# Patient Record
Sex: MALE | HISPANIC OR LATINO | Employment: UNEMPLOYED | URBAN - METROPOLITAN AREA
[De-identification: names, ages, dates, MRNs, and addresses within clinical notes are randomized per-mention and may not be internally consistent; named-entity substitution may affect disease eponyms.]

---

## 2017-03-02 ENCOUNTER — OFFICE VISIT (OUTPATIENT)
Dept: ORTHOPEDICS | Facility: CLINIC | Age: 32
End: 2017-03-02
Payer: COMMERCIAL

## 2017-03-02 ENCOUNTER — RADIANT APPOINTMENT (OUTPATIENT)
Dept: GENERAL RADIOLOGY | Facility: CLINIC | Age: 32
End: 2017-03-02
Attending: FAMILY MEDICINE
Payer: COMMERCIAL

## 2017-03-02 VITALS
DIASTOLIC BLOOD PRESSURE: 90 MMHG | WEIGHT: 190 LBS | HEART RATE: 98 BPM | BODY MASS INDEX: 28.79 KG/M2 | SYSTOLIC BLOOD PRESSURE: 138 MMHG | HEIGHT: 68 IN

## 2017-03-02 DIAGNOSIS — G89.29 CHRONIC PAIN OF RIGHT KNEE: ICD-10-CM

## 2017-03-02 DIAGNOSIS — M25.561 CHRONIC PAIN OF RIGHT KNEE: Primary | ICD-10-CM

## 2017-03-02 DIAGNOSIS — G89.29 CHRONIC PAIN OF RIGHT KNEE: Primary | ICD-10-CM

## 2017-03-02 DIAGNOSIS — M25.561 CHRONIC PAIN OF RIGHT KNEE: ICD-10-CM

## 2017-03-02 PROCEDURE — 73562 X-RAY EXAM OF KNEE 3: CPT | Mod: RT | Performed by: RADIOLOGY

## 2017-03-02 PROCEDURE — 99213 OFFICE O/P EST LOW 20 MIN: CPT | Mod: 25 | Performed by: FAMILY MEDICINE

## 2017-03-02 PROCEDURE — 20610 DRAIN/INJ JOINT/BURSA W/O US: CPT | Mod: RT | Performed by: FAMILY MEDICINE

## 2017-03-02 RX ORDER — TRIAMCINOLONE ACETONIDE 40 MG/ML
40 INJECTION, SUSPENSION INTRA-ARTICULAR; INTRAMUSCULAR ONCE
Qty: 1 ML | Refills: 0 | OUTPATIENT
Start: 2017-03-02 | End: 2017-03-02

## 2017-03-02 RX ORDER — MULTIPLE VITAMINS W/ MINERALS TAB 9MG-400MCG
1 TAB ORAL DAILY
COMMUNITY
End: 2021-12-28

## 2017-03-02 ASSESSMENT — PAIN SCALES - GENERAL: PAINLEVEL: EXTREME PAIN (9)

## 2017-03-02 NOTE — MR AVS SNAPSHOT
After Visit Summary   3/2/2017    Kenn Dobson    MRN: [id number]           Patient Information     Date Of Birth          1985        Visit Information        Provider Department      3/2/2017 2:00 PM Keivn Ospina, DO [facility]        Today's Diagnoses     Chronic pain of right knee    -  1      Care Instructions    Thanks for coming today.  Ortho/Sports Medicine Clinic  [address] [place] [address]    To schedule future appointments in Ortho Clinic, you may call [phone].    To schedule ordered imaging by your provider:   Call Central Imaging [phone]    To schedule an injection ordered by your provider:  Call Central Imaging Injection scheduling line: [phone]  Learn with Homer available online at:  [email or web address]    Please call if any further questions or concerns ([phone]).  Clinic hours 8 am to 5 pm.    Return to clinic (call) if symptoms worsen or fail to improve.          Follow-ups after your visit        Who to contact     If you have questions or need follow up information about today's clinic visit or your schedule please contact [facility] [[email or web address]] directly at [phone].  Normal or non-critical lab and imaging results will be communicated to you by MyChart, letter or phone within 4 business days after the clinic has received the results. If you do not hear from us within 7 days, please contact the clinic through Volleehart or phone. If you have a critical or abnormal lab result, we will notify you by phone as soon as possible.  Submit refill requests through Learn with Homer or call your pharmacy and they will forward the refill request to us. Please allow 3 business days for your refill to be completed.          Additional Information About Your Visit        Volleehart Information     Learn with Homer is an electronic gateway that provides easy, online access to your medical records. With Learn with Homer, you can request a  "clinic appointment, read your test results, renew a prescription or communicate with your care team.     To sign up for Carticipate visit the website at [email or web address]   You will be asked to enter the access code listed below, as well as some personal information. Please follow the directions to create your username and password.     Your access code is: [email or web address]  Expires: 2017  3:34 PM     Your access code will  in 90 days. If you need help or a new code, please contact your [facility] or call [phone] for assistance.        Care EveryWhere ID     This is your Care EveryWhere ID. This could be used by other organizations to access your [place] medical records  [id number]        Your Vitals Were     Pulse Height BMI (Body Mass Index)             98 1.715 m (5' 7.5\") 29.32 kg/m2          Blood Pressure from Last 3 Encounters:   17 138/90   14 122/77   14 120/80    Weight from Last 3 Encounters:   17 86.2 kg (190 lb)   14 85.6 kg (188 lb 12.8 oz)   14 85.6 kg (188 lb 12.8 oz)              We Performed the Following     DRAIN/INJECT LARGE JOINT/BURSA          Today's Medication Changes          These changes are accurate as of: 3/2/17  3:34 PM.  If you have any questions, ask your nurse or doctor.               Start taking these medicines.        Dose/Directions    triamcinolone acetonide 40 MG/ML injection   Commonly known as:  KENALOG   Used for:  Chronic pain of right knee   Started by:  Kevin Ospina DO        Dose:  40 mg   1 mL (40 mg) by INTRA-ARTICULAR route once for 1 dose   Quantity:  1 mL   Refills:  0            Where to get your medicines      Some of these will need a paper prescription and others can be bought over the counter.  Ask your nurse if you have questions.     You don't need a prescription for these medications     triamcinolone acetonide 40 MG/ML injection                Primary Care Provider " Office Phone # Fax #    Geraldine Priya Keyes -[phone]       [facility] [address] [facility] [address]        Thank you!     Thank you for choosing [facility]  for your care. Our goal is always to provide you with excellent care. Hearing back from our patients is one way we can continue to improve our services. Please take a few minutes to complete the written survey that you may receive in the mail after your visit with us. Thank you!             Your Updated Medication List - Protect others around you: Learn how to safely use, store and throw away your medicines at www.disposemymeds.org.          This list is accurate as of: 3/2/17  3:34 PM.  Always use your most recent med list.                   Brand Name Dispense Instructions for use    cetirizine 10 MG tablet    zyrTEC    30 tablet    Take 1 tablet (10 mg) by mouth every evening       fluticasone 50 MCG/ACT spray    FLONASE    1 Package    Spray 1-2 sprays into both nostrils daily       Multi-vitamin Tabs tablet      Take 1 tablet by mouth daily       triamcinolone acetonide 40 MG/ML injection    KENALOG    1 mL    1 mL (40 mg) by INTRA-ARTICULAR route once for 1 dose

## 2017-03-02 NOTE — PATIENT INSTRUCTIONS
Thanks for coming today.  Ortho/Sports Medicine Clinic  [address] [place] [address]    To schedule future appointments in Ortho Clinic, you may call [phone].    To schedule ordered imaging by your provider:   Call Central Imaging [phone]    To schedule an injection ordered by your provider:  Call Central Imaging Injection scheduling line: [phone]  Gogoyokohart available online at:  [email or web address]    Please call if any further questions or concerns ([phone]).  Clinic hours 8 am to 5 pm.    Return to clinic (call) if symptoms worsen or fail to improve.

## 2017-03-02 NOTE — NURSING NOTE
"Kenn Dobson's goals for this visit include: Find a solution for right collar bone and right knee pain  He requests these members of his care team be copied on today's visit information: yes    PCP: Geraldine Keyes    Referring Provider:  Referred Self, MD  No address on file    Chief Complaint   Patient presents with     Consult     Pain x 2012-Collar bone. Wrestling related injury. Right knee pain for the past 5 years.      Knee right     Shoulder right       Initial /90 (BP Location: Right arm, Patient Position: Chair, Cuff Size: Adult Regular)  Pulse 98  Ht 1.715 m (5' 7.5\")  Wt 86.2 kg (190 lb)  BMI 29.32 kg/m2 Estimated body mass index is 29.32 kg/(m^2) as calculated from the following:    Height as of this encounter: 1.715 m (5' 7.5\").    Weight as of this encounter: 86.2 kg (190 lb).  Medication Reconciliation: complete    "

## 2017-03-02 NOTE — PROGRESS NOTES
"HISTORY OF PRESENT ILLNESS  Mr. Dobson is a pleasant 31 year old year old male who presents to clinic today with two problems.  Kenn is a  at [employer].  He explains that he's had right knee pain for the past 5 years.  Uncomfortable pain, ranges from mild to severe.  He's had 3 knee scopes in this knee over his life for mensicus tears from [id number].  Pain has been fairly consistent since his 2nd surgery.  Pain is excruciating with running, definitely worse the more he's on his feet.  Feels ok when he's on a wrestling mat.  Slightly better with icing, rest is the most helpful.  Has tried braces in the past, not helpful.  Points to his medial joint line.  Will get swollen behind the knee with prolonged use. Pops and catches on occasion.  Kenn sprained his SC joint in 2012 during wrestling.  At that time was treated with conservative care.  Still has pain on most days.  Does feel clicking, will sublux on occasion, \"feels like it's out sometimes.\"  Not bothering him at the moment nearly as much as his knee.  Timing: occurs intermittently  Associated signs & symptoms: none  Previous similar pain: yes  Additional history: as documented    MEDICAL HISTORY  Patient Active Problem List   Diagnosis     Cauliflower right ear     ED (erectile dysfunction)     Internal hemorrhoid     Multiple joint pain       Current Outpatient Prescriptions   Medication Sig Dispense Refill     multivitamin, therapeutic with minerals (MULTI-VITAMIN) TABS tablet Take 1 tablet by mouth daily       fluticasone (FLONASE) 50 MCG/ACT nasal spray Spray 1-2 sprays into both nostrils daily 1 Package 11     cetirizine (ZYRTEC) 10 MG tablet Take 1 tablet (10 mg) by mouth every evening 30 tablet 3       Allergies   Allergen Reactions     Dogs        No family history on file.    Additional medical/Social/Surgical histories reviewed in [facility] and updated as appropriate.     REVIEW OF SYSTEMS (3/2/2017)  10 point ROS of systems including " "Constitutional, Eyes, Respiratory, Cardiovascular, Gastroenterology, Genitourinary, Integumentary, Musculoskeletal, Psychiatric were all negative except for pertinent positives noted in my HPI.     PHYSICAL EXAM  Vitals:    03/02/17 1404   BP: 138/90   BP Location: Right arm   Patient Position: Chair   Cuff Size: Adult Regular   Pulse: 98   Weight: 86.2 kg (190 lb)   Height: 1.715 m (5' 7.5\")     General  - normal appearance, in no obvious distress  CV  - normal popliteal pulse  Pulm  - normal respiratory pattern, non-labored  Musculoskeletal - right knee  - stance: normal gait without limp, no obvious leg length discrepancy  - inspection: trace effusion, no ecchymosis, normal muscle tone, normal bone and joint alignment, no obvious deformity  - palpation: medial joint line tenderness, patella and patellar tendon non-tender, normal popliteal pulse  - ROM: 135 degrees flexion - painful, -5 degrees extension, pain at terminal flexion and extension passively  - strength: 5/5 in flexion, 5/5 in extension  - neuro: no sensory or motor deficit  - special tests:  (-) Lachman  (+) Chavez  (-) varus at 0 and 30 degrees flexion  (-) valgus at 0 and 30 degrees flexion    Musculoskeletal - right shoulder  - palpation: no bony or soft tissue tenderness, normal clavicle, non-tender AC, non-tender SC    - strength: 5/5  strength, 5/5 in all shoulder planes  Neuro  - no sensory or motor deficit, grossly normal coordination, normal muscle tone  Skin  - no ecchymosis, erythema, warmth, or induration, no obvious rash  Psych  - interactive, appropriate, normal mood and affect        ASSESSMENT & PLAN  Mr. Dobson is a 31 year old year old male who is in the office today with right knee pain and right collarbone pain.    I ordered & reviewed an xray of his right knee which shows some mild medial joint space narrowing and lateral tibial and femoral condyle osteophytes.    We had a long discussion centering around his knee pain.  He " likely has chronic intraarticular changes secondary to traumatic events and post-surgical changes.  An MR may be helpful if he has a large cartilage lesion or meniscal pathology that could be surgical.    For now we decided to inject his knee for short term relief.    Kenn's going to let me know how he's doing after the wrestling season.    It was a pleasure taking care of Kenn.        Kevin Ospina DO, CAQSM        PROCEDURE    Knee Injection - Intraarticular  The patient was informed of the risks and the benefits of the procedure and a written consent was signed.  The patient s right knee was prepped with chlorhexidine in sterile fashion.   40 mg of triamcinolone suspension was drawn up into a 5 mL syringe with 2 mL of 1% lidocaine and 2 mL of 0.5% marcaine.  Injection was performed using substerile technique.  A 1.5-inch 25-gauge needle was used to enter the lateral aspect of the right knee.  Injection performed successfully without difficulty.  There were no complications. The patient tolerated the procedure well. There was negligible bleeding.   The patient was instructed to ice the knee upon leaving clinic and refrain from overuse over the next 3 days.   The patient was instructed to call or go to the emergency room with any unusual pain, swelling, redness, or if otherwise concerned.  A follow up appointment will be scheduled to evaluate response to the injection, and to assess range of motion and pain.  Kenalog: NDC [id number]

## 2017-06-13 ENCOUNTER — OFFICE VISIT (OUTPATIENT)
Dept: URGENT CARE | Facility: URGENT CARE | Age: 32
End: 2017-06-13
Payer: COMMERCIAL

## 2017-06-13 ENCOUNTER — RADIANT APPOINTMENT (OUTPATIENT)
Dept: GENERAL RADIOLOGY | Facility: CLINIC | Age: 32
End: 2017-06-13
Attending: FAMILY MEDICINE
Payer: COMMERCIAL

## 2017-06-13 VITALS
OXYGEN SATURATION: 98 % | SYSTOLIC BLOOD PRESSURE: 117 MMHG | WEIGHT: 184 LBS | BODY MASS INDEX: 28.39 KG/M2 | TEMPERATURE: 98 F | HEART RATE: 55 BPM | DIASTOLIC BLOOD PRESSURE: 72 MMHG

## 2017-06-13 DIAGNOSIS — M25.531 RIGHT WRIST PAIN: ICD-10-CM

## 2017-06-13 DIAGNOSIS — S66.911A STRAIN OF RIGHT WRIST, INITIAL ENCOUNTER: ICD-10-CM

## 2017-06-13 DIAGNOSIS — M25.531 RIGHT WRIST PAIN: Primary | ICD-10-CM

## 2017-06-13 PROCEDURE — 73110 X-RAY EXAM OF WRIST: CPT | Mod: RT

## 2017-06-13 PROCEDURE — 99213 OFFICE O/P EST LOW 20 MIN: CPT | Performed by: FAMILY MEDICINE

## 2017-06-13 ASSESSMENT — PAIN SCALES - GENERAL: PAINLEVEL: MODERATE PAIN (5)

## 2017-06-13 NOTE — MR AVS SNAPSHOT
"              After Visit Summary   2017    Kenn Dobson    MRN: [id number]           Patient Information     Date Of Birth          1985        Visit Information        Provider Department      2017 12:20 PM Ian Dwyer MD [facility]        Today's Diagnoses     Right wrist pain    -  1       Follow-ups after your visit        Who to contact     If you have questions or need follow up information about today's clinic visit or your schedule please contact [facility] directly at [phone].  Normal or non-critical lab and imaging results will be communicated to you by Placedhart, letter or phone within 4 business days after the clinic has received the results. If you do not hear from us within 7 days, please contact the clinic through Placedhart or phone. If you have a critical or abnormal lab result, we will notify you by phone as soon as possible.  Submit refill requests through AllDigital or call your pharmacy and they will forward the refill request to us. Please allow 3 business days for your refill to be completed.          Additional Information About Your Visit        MyChart Information     AllDigital lets you send messages to your doctor, view your test results, renew your prescriptions, schedule appointments and more. To sign up, go to [email or web address] . Click on \"Log in\" on the left side of the screen, which will take you to the Welcome page. Then click on \"Sign up Now\" on the right side of the page.     You will be asked to enter the access code listed below, as well as some personal information. Please follow the directions to create your username and password.     Your access code is: [email or web address]  Expires: 2017  1:42 PM     Your access code will  in 90 days. If you need help or a new code, please call your [facility] or [phone].        Care EveryWhere ID     This is your Care EveryWhere ID. This could be used " by other organizations to access your [place] medical records  [id number]        Your Vitals Were     Pulse Temperature Pulse Oximetry BMI (Body Mass Index)          55 98  F (36.7  C) (Oral) 98% 28.39 kg/m2         Blood Pressure from Last 3 Encounters:   06/13/17 117/72   03/02/17 138/90   06/04/14 122/77    Weight from Last 3 Encounters:   06/13/17 184 lb (83.5 kg)   03/02/17 190 lb (86.2 kg)   06/04/14 188 lb 12.8 oz (85.6 kg)               Primary Care Provider Office Phone # Fax #    Geraldine Priya Keyes [phone]       [facility] [address] [facility] [address]        Thank you!     Thank you for choosing [facility]  for your care. Our goal is always to provide you with excellent care. Hearing back from our patients is one way we can continue to improve our services. Please take a few minutes to complete the written survey that you may receive in the mail after your visit with us. Thank you!             Your Updated Medication List - Protect others around you: Learn how to safely use, store and throw away your medicines at www.disposemymeds.org.          This list is accurate as of: 6/13/17  1:42 PM.  Always use your most recent med list.                   Brand Name Dispense Instructions for use    cetirizine 10 MG tablet    zyrTEC    30 tablet    Take 1 tablet (10 mg) by mouth every evening       fluticasone 50 MCG/ACT spray    FLONASE    1 Package    Spray 1-2 sprays into both nostrils daily       Multi-vitamin Tabs tablet      Take 1 tablet by mouth daily

## 2017-06-13 NOTE — PROGRESS NOTES
Some of this note was populated by a medical assistant.    SUBJECTIVE:                                                    Kenn Dobson is a 31 year old male who presents to clinic today for the following health issues:      Musculoskeletal problem/pain   right wrist pain.     Duration: about one week- worse the last 2-3 days. Does not recall any particular injury however. May have fallen on it but does not recall.     Description  Location: left wrist, left thumb    Intensity:  5/10    Accompanying signs and symptoms: throbbing pain at times    History  Previous similar problem: has injured fingers on same hand before, but not wrist or near thumb pain.   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 May be a factor.   Aggravating factors include: weight bearing or resistant bearing activities.    Therapies tried and outcome: rest/inactivity, tylenol, iced area- no relief.         Problem list and histories reviewed & adjusted, as indicated.  Additional history: as documented    Patient Active Problem List   Diagnosis     Cauliflower right ear     ED (erectile dysfunction)     Internal hemorrhoid     Multiple joint pain     No past surgical history on file.    Social History   Substance Use Topics     Smoking status: Former Smoker     Smokeless tobacco: Not on file     Alcohol use Yes      Comment: Drinks daily     No family history on file.      Current Outpatient Prescriptions   Medication Sig Dispense Refill     multivitamin, therapeutic with minerals (MULTI-VITAMIN) TABS tablet Take 1 tablet by mouth daily       fluticasone (FLONASE) 50 MCG/ACT nasal spray Spray 1-2 sprays into both nostrils daily 1 Package 11     cetirizine (ZYRTEC) 10 MG tablet Take 1 tablet (10 mg) by mouth every evening (Patient not taking: Reported on 6/13/2017) 30 tablet 3     Allergies   Allergen Reactions     Dogs        Reviewed and updated as needed this visit  by clinical staff       Reviewed and updated as needed this visit by Provider         ROS:  Constitutional, HEENT, cardiovascular, pulmonary, gi and gu systems are negative, except as otherwise noted.    OBJECTIVE:                                                    /72 (BP Location: Left arm, Patient Position: Chair, Cuff Size: Adult Regular)  Pulse 55  Temp 98  F (36.7  C) (Oral)  Wt 184 lb (83.5 kg)  SpO2 98%  BMI 28.39 kg/m2  Body mass index is 28.39 kg/(m^2).  GENERAL: healthy, alert and no distress  NECK: no adenopathy, no asymmetry, masses, or scars and thyroid normal to palpation  RESP: lungs clear to auscultation - no rales, rhonchi or wheezes  CV: regular rate and rhythm, normal S1 S2, no S3 or S4, no murmur, click or rub, no peripheral edema and peripheral pulses strong  ABDOMEN: soft, nontender, no hepatosplenomegaly, no masses and bowel sounds normal  MS: no gross musculoskeletal defects noted, minimal edema base of thumb. Full ROM noted although slightly painful at base of thumb. Wrist area not tender. Scaphoid or snuffbox area not appreciable tender.   Mild pain at base of thumb overlying EBL EPB tendons of thumb.           ASSESSMENT/PLAN:                                                        ICD-10-CM    1. Right wrist pain M25.531 XR Wrist Right G/E 3 Views   2. Strain of right wrist, initial encounter S66.911A          PLAN  Brace declined by patient. Avoid wresting for the time until pain improves.   Activity modification described. Avoid risk of further injury. Proper pain medicines explained.   Patient educational/instructional material provided including reasons for follow-up   The patient indicates understanding of these issues and agrees with the plan.  Ian Dwyer MD        [facility]

## 2017-06-13 NOTE — NURSING NOTE
"Chief Complaint   Patient presents with     Musculoskeletal Problem     Pt c/o left wrist/hand pain near thumb for about one week.        Initial /72 (BP Location: Left arm, Patient Position: Chair, Cuff Size: Adult Regular)  Pulse 55  Temp 98  F (36.7  C) (Oral)  Wt 184 lb (83.5 kg)  SpO2 98%  BMI 28.39 kg/m2 Estimated body mass index is 28.39 kg/(m^2) as calculated from the following:    Height as of 3/2/17: 5' 7.5\" (1.715 m).    Weight as of this encounter: 184 lb (83.5 kg).  Medication Reconciliation: complete     Vicky Henry CMA (AAMA)      "

## 2017-08-30 ENCOUNTER — OFFICE VISIT (OUTPATIENT)
Dept: ORTHOPEDICS | Facility: CLINIC | Age: 32
End: 2017-08-30
Payer: COMMERCIAL

## 2017-08-30 VITALS — DIASTOLIC BLOOD PRESSURE: 64 MMHG | HEART RATE: 58 BPM | SYSTOLIC BLOOD PRESSURE: 122 MMHG | OXYGEN SATURATION: 98 %

## 2017-08-30 DIAGNOSIS — G89.29 CHRONIC PAIN OF RIGHT KNEE: Primary | ICD-10-CM

## 2017-08-30 DIAGNOSIS — M25.561 CHRONIC PAIN OF RIGHT KNEE: Primary | ICD-10-CM

## 2017-08-30 PROCEDURE — 99213 OFFICE O/P EST LOW 20 MIN: CPT | Mod: 25 | Performed by: FAMILY MEDICINE

## 2017-08-30 PROCEDURE — 20610 DRAIN/INJ JOINT/BURSA W/O US: CPT | Mod: RT | Performed by: FAMILY MEDICINE

## 2017-08-30 RX ORDER — TRIAMCINOLONE ACETONIDE 40 MG/ML
40 INJECTION, SUSPENSION INTRA-ARTICULAR; INTRAMUSCULAR ONCE
Qty: 1 ML | Refills: 0 | OUTPATIENT
Start: 2017-08-30 | End: 2017-08-30

## 2017-08-30 ASSESSMENT — PAIN SCALES - GENERAL: PAINLEVEL: WORST PAIN (10)

## 2017-08-30 NOTE — MR AVS SNAPSHOT
After Visit Summary   2017    Kenn Dobson    MRN: [id number]           Patient Information     Date Of Birth          1985        Visit Information        Provider Department      2017 1:00 PM Kevin Ospina, DO [facility]        Today's Diagnoses     Chronic pain of right knee    -  1      Care Instructions    Thanks for coming today.  Ortho[facility] Medicine Clinic  [address] [place] [address]    To schedule future appointments in Ortho Clinic, you may call [phone].    To schedule ordered imaging by your provider:   Call Central Imaging [phone]    To schedule an injection ordered by your provider:  Call Central Imaging Injection scheduling line: [phone]  Rentamus available online at:  [email or web address]    Please call if any further questions or concerns ([phone]).  Clinic hours 8 am to 5 pm.    Return to clinic (call) if symptoms worsen or fail to improve.            Follow-ups after your visit        Who to contact     If you have questions or need follow up information about today's clinic visit or your schedule please contact [facility] directly at [phone].  Normal or non-critical lab and imaging results will be communicated to you by MyChart, letter or phone within 4 business days after the clinic has received the results. If you do not hear from us within 7 days, please contact the clinic through Stayfilmhart or phone. If you have a critical or abnormal lab result, we will notify you by phone as soon as possible.  Submit refill requests through Rentamus or call your pharmacy and they will forward the refill request to us. Please allow 3 business days for your refill to be completed.          Additional Information About Your Visit        Stayfilmhart Information     Rentamus is an electronic gateway that provides easy, online access to your medical records. With Rentamus, you can request a  clinic appointment, read your test results, renew a prescription or communicate with your care team.     To sign up for Kannacthart visit the website at [email or web address]   You will be asked to enter the access code listed below, as well as some personal information. Please follow the directions to create your username and password.     Your access code is: [email or web address]  Expires: 2017  1:42 PM     Your access code will  in 90 days. If you need help or a new code, please contact your [facility] or call [phone] for assistance.        Care EveryWhere ID     This is your Care EveryWhere ID. This could be used by other organizations to access your [place] medical records  [id number]        Your Vitals Were     Pulse Pulse Oximetry                58 98%           Blood Pressure from Last 3 Encounters:   17 122/64   17 117/72   17 138/90    Weight from Last 3 Encounters:   17 83.5 kg (184 lb)   17 86.2 kg (190 lb)   14 85.6 kg (188 lb 12.8 oz)              We Performed the Following     DRAIN/INJECT LARGE JOINT/BURSA          Today's Medication Changes          These changes are accurate as of: 17  1:24 PM.  If you have any questions, ask your nurse or doctor.               Start taking these medicines.        Dose/Directions    triamcinolone acetonide 40 MG/ML injection   Commonly known as:  KENALOG   Used for:  Chronic pain of right knee        Dose:  40 mg   1 mL (40 mg) by INTRA-ARTICULAR route once for 1 dose   Quantity:  1 mL   Refills:  0            Where to get your medicines      Some of these will need a paper prescription and others can be bought over the counter.  Ask your nurse if you have questions.     You don't need a prescription for these medications     triamcinolone acetonide 40 MG/ML injection                Primary Care Provider Office Phone # Fax #    Geraldine Keyes [phone]        [address] [facility] [address]        Equal Access to Services     HORTENSIA JASSO : Hadii aad ku hadernstmaile Mercado, wasanthoshda guerita, qadodieharmeet colinmachristian salinas, dat gasilvinaalanis castro. So [facility] [phone].    ATENCIÓN: Si habla español, tiene a badillo disposición servicios gratuitos de asistencia lingüística. Llame al [phone].    We comply with applicable federal civil rights laws and [place] laws. We do not discriminate on the basis of race, color, national origin, age, disability sex, sexual orientation or gender identity.            Thank you!     Thank you for choosing [facility]  for your care. Our goal is always to provide you with excellent care. Hearing back from our patients is one way we can continue to improve our services. Please take a few minutes to complete the written survey that you may receive in the mail after your visit with us. Thank you!             Your Updated Medication List - Protect others around you: Learn how to safely use, store and throw away your medicines at www.disposemymeds.org.          This list is accurate as of: 8/30/17  1:24 PM.  Always use your most recent med list.                   Brand Name Dispense Instructions for use Diagnosis    cetirizine 10 MG tablet    zyrTEC    30 tablet    Take 1 tablet (10 mg) by mouth every evening    Allergic rhinitis, cause unspecified       fluticasone 50 MCG/ACT spray    FLONASE    1 Package    Spray 1-2 sprays into both nostrils daily    Allergic rhinitis, cause unspecified       Multi-vitamin Tabs tablet      Take 1 tablet by mouth daily        triamcinolone acetonide 40 MG/ML injection    KENALOG    1 mL    1 mL (40 mg) by INTRA-ARTICULAR route once for 1 dose    Chronic pain of right knee

## 2017-08-30 NOTE — NURSING NOTE
"Kenn Dobson's goals for this visit include: Discuss Cortisone injections in his right knee  He requests these members of his care team be copied on today's visit information: yes    PCP: Geraldine Keyes    Referring Provider:  No referring provider defined for this encounter.    Chief Complaint   Patient presents with     RECHECK     Knee right     Patient would like to discuss getting a Cortisone injection in the right knee       Initial /64 (BP Location: Right arm, Patient Position: Chair, Cuff Size: Adult Regular)  Pulse 58  SpO2 98% Estimated body mass index is 28.39 kg/(m^2) as calculated from the following:    Height as of 3/2/17: 1.715 m (5' 7.5\").    Weight as of 6/13/17: 83.5 kg (184 lb).  Medication Reconciliation: complete    "

## 2017-08-30 NOTE — PATIENT INSTRUCTIONS
Thanks for coming today.  Ortho[facility] Medicine Clinic  [address] [place] [address]    To schedule future appointments in Ortho Clinic, you may call [phone].    To schedule ordered imaging by your provider:   Call Central Imaging [phone]    To schedule an injection ordered by your provider:  Call Central Imaging Injection scheduling line: [phone]  Videofropperhart available online at:  [email or web address]    Please call if any further questions or concerns ([phone]).  Clinic hours 8 am to 5 pm.    Return to clinic (call) if symptoms worsen or fail to improve.

## 2017-08-30 NOTE — PROGRESS NOTES
HISTORY OF PRESENT ILLNESS  Mr. Dobson is a pleasant 32 year old year old male following up with right knee pain. I last saw Kenn in March. At that visit I injected his right knee with corticosteroid.  Kenn's injection helped for most of the summer.  He started to feel the slow return of pain about 3 weeks ago.  He's interested in a repeat injection today.   Additional history: as documented    MEDICAL HISTORY  Patient Active Problem List   Diagnosis     Cauliflower right ear     ED (erectile dysfunction)     Internal hemorrhoid     Multiple joint pain       Current Outpatient Prescriptions   Medication Sig Dispense Refill     multivitamin, therapeutic with minerals (MULTI-VITAMIN) TABS tablet Take 1 tablet by mouth daily       fluticasone (FLONASE) 50 MCG/ACT nasal spray Spray 1-2 sprays into both nostrils daily 1 Package 11     cetirizine (ZYRTEC) 10 MG tablet Take 1 tablet (10 mg) by mouth every evening (Patient not taking: Reported on 6/13/2017) 30 tablet 3       Allergies   Allergen Reactions     Dogs        No family history on file.    Additional medical/Social/S[facility] and updated as appropriate.     REVIEW OF SYSTEMS (8/30/2017)  10 point ROS of systems including Constitutional, Eyes, Respiratory, Cardiovascular, Gastroenterology, Genitourinary, Integumentary, Musculoskeletal, Psychiatric were all negative except for pertinent positives noted in my HPI.     PHYSICAL EXAM  Vitals:    08/30/17 1306   BP: 122/64   BP Location: Right arm   Patient Position: Chair   Cuff Size: Adult Regular   Pulse: 58   SpO2: 98%     General  - normal appearance, in no obvious distress  CV  - normal popliteal pulse  Pulm  - normal respiratory pattern, non-labored  Musculoskeletal - right knee  - stance: normal gait without limp, no obvious leg length discrepancy  - inspection: no effusion, no ecchymosis, normal muscle tone, normal bone and joint alignment, no obvious deformity  - palpation: mild  medial joint line tenderness, patella and patellar tendon non-tender, normal popliteal pulse  - strength: 5/5 in flexion, 5/5 in extension  - neuro: no sensory or motor deficit  - special tests:  (-) Lachman  (-) Chavez  (-) varus at 0 and 30 degrees flexion  (-) valgus at 0 and 30 degrees flexion    ASSESSMENT & PLAN  Mr. Dobson is a 32 year old year old male who is in the office today following up with right knee pain.    I repeated his injection today (see procedure note).    If Kenn doesn't get relief I'd consider advanced imaging.    Kenn can follow up as needed.    It was a pleasure taking care of Kenn.        Kevin Ospina DO, CAQSM        PROCEDURE    Knee Injection - Intraarticular  The patient was informed of the risks and the benefits of the procedure and a written consent was signed.  The patient s right knee was prepped with chlorhexidine in sterile fashion.   40 mg of triamcinolone suspension was drawn up into a 5 mL syringe with 2 mL of 1% lidocaine and 2 mL of 0.5% marcaine.  Injection was performed using substerile technique.  A 1.5-inch 25-gauge needle was used to enter the lateral aspect of the right knee.  Injection performed successfully without difficulty.  There were no complications. The patient tolerated the procedure well. There was negligible bleeding.   The patient was instructed to ice the knee upon leaving clinic and refrain from overuse over the next 3 days.   The patient was instructed to call or go to the emergency room with any unusual pain, swelling, redness, or if otherwise concerned.  A follow up appointment will be scheduled to evaluate response to the injection, and to assess range of motion and pain.  Kenalog: NDC [id number]

## 2017-10-02 ENCOUNTER — OFFICE VISIT (OUTPATIENT)
Dept: FAMILY MEDICINE | Facility: CLINIC | Age: 32
End: 2017-10-02
Payer: COMMERCIAL

## 2017-10-02 VITALS
SYSTOLIC BLOOD PRESSURE: 127 MMHG | WEIGHT: 175.4 LBS | HEART RATE: 66 BPM | TEMPERATURE: 98 F | OXYGEN SATURATION: 98 % | BODY MASS INDEX: 27.07 KG/M2 | DIASTOLIC BLOOD PRESSURE: 75 MMHG

## 2017-10-02 DIAGNOSIS — Z23 NEED FOR PROPHYLACTIC VACCINATION AND INOCULATION AGAINST INFLUENZA: ICD-10-CM

## 2017-10-02 DIAGNOSIS — K85.20 ALCOHOL-INDUCED ACUTE PANCREATITIS, UNSPECIFIED COMPLICATION STATUS: Primary | ICD-10-CM

## 2017-10-02 PROCEDURE — 99214 OFFICE O/P EST MOD 30 MIN: CPT | Performed by: PHYSICIAN ASSISTANT

## 2017-10-02 ASSESSMENT — ANXIETY QUESTIONNAIRES
6. BECOMING EASILY ANNOYED OR IRRITABLE: NOT AT ALL
2. NOT BEING ABLE TO STOP OR CONTROL WORRYING: NEARLY EVERY DAY
1. FEELING NERVOUS, ANXIOUS, OR ON EDGE: NEARLY EVERY DAY
IF YOU CHECKED OFF ANY PROBLEMS ON THIS QUESTIONNAIRE, HOW DIFFICULT HAVE THESE PROBLEMS MADE IT FOR YOU TO DO YOUR WORK, TAKE CARE OF THINGS AT HOME, OR GET ALONG WITH OTHER PEOPLE: SOMEWHAT DIFFICULT
GAD7 TOTAL SCORE: 18
5. BEING SO RESTLESS THAT IT IS HARD TO SIT STILL: NEARLY EVERY DAY
7. FEELING AFRAID AS IF SOMETHING AWFUL MIGHT HAPPEN: NEARLY EVERY DAY
3. WORRYING TOO MUCH ABOUT DIFFERENT THINGS: NEARLY EVERY DAY

## 2017-10-02 ASSESSMENT — PATIENT HEALTH QUESTIONNAIRE - PHQ9
5. POOR APPETITE OR OVEREATING: NEARLY EVERY DAY
SUM OF ALL RESPONSES TO PHQ QUESTIONS 1-9: 4

## 2017-10-02 NOTE — PATIENT INSTRUCTIONS
Discharge Instructions for Acute Pancreatitis  You have been diagnosed with acute pancreatitis. Your pancreas is inflamed or swollen. The pancreas is an organ that makes digestive juices and hormones. Gallstones are a common cause of pancreatitis. These hard stones form in the gallbladder. The gallbladder shares a tube with the pancreas into the small intestine. If gallstones block this tube, fluid can t leave the pancreas. The fluid backs up and causes redness and swelling (inflammation). There are other causes of pancreatitis. Make sure you understand the cause of your pancreatitis. Then you can try to stop it from happening again.  Immediate home care    Find someone to drive you to appointments. Acute pancreatitis is a serious condition, and you should never drive if you are experiencing symptoms.    Stop drinking if your illness was caused by alcohol.    Ask your healthcare provider about alcohol abuse programs and support groups such as Alcoholics Anonymous.    Ask your provider about prescription medicines that can help you stop drinking.    Tell your provider about the alcohol withdrawal symptoms you have when you stop drinking. This is very important. You may need close medical supervision and special medicines when you stop drinking. This will depend on your alcohol withdrawal history.     Take your medicines exactly as directed. Don t skip doses.    Eat a low-fat diet. Ask your provider for menus and other diet information.    Learn to take your own pulse. Keep a record of your results. Ask your provider which readings mean that you need medical attention.  Ongoing care    Tell your provider about any medicines you are taking. Some medicines can cause this condition.    Before starting any new medicine, ask your provider if it will harm your pancreas. This includes any new over-the-counter medicines, vitamins, or herbal supplements.      Tell your provider if you lose weight without dieting.    Be aware  of symptoms that may mean your pancreatitis has come back. These symptoms include belly pain, nausea and vomiting, and fever.    Keep all follow-up appointments with your provider. Problems can often show up later.  Follow-up  Follow up with your healthcare provider, or as advised.  When to call your provider  Call your healthcare provider right away if you have any of the following:    Fever of 100.4 F (38.0 C) or higher, or as advised by your provider    Severe pain from your upper belly to your back    Nausea and vomiting    Feely dizzy or lightheaded    Yellowing of your skin or eyes (jaundice)    Bruises on your belly or back    Belly swelling and tenderness    Rapid pulse    Shallow, fast breathing   Date Last Reviewed: 8/1/2016 2000-2017 [employer]. [address] [place] [address], [place] [address]. All rights reserved. This information is not intended as a substitute for professional medical care. Always follow your healthcare professional's instructions.

## 2017-10-02 NOTE — PROGRESS NOTES
SUBJECTIVE:   Kenn Dobson is a 32 year old male who presents to clinic today for the following health issues:      Hospital Follow-up Visit:    Hospital/Nursing Home/IP Rehab Facility: [facility]  Date of Admission: 10/01/2017  Date of Discharge: 10/01/2017 released him self and did not receive any documents from discharge  Reason(s) for Admission: pancreatitis            Problems taking medications regularly:  None       Medication changes since discharge: None       Problems adhering to non-medication therapy:  None    Summary of hospitalization:  [facility] discharge summary reviewed  Diagnostic Tests/Treatments reviewed.  Follow up needed: Ongoing PCP management  Other Healthcare Providers Involved in Patient s Care:         None  Update since discharge: improved.     Post Discharge Medication Reconciliation: discharge medications reconciled, continue medications without change.  Plan of care communicated with patient     Coding guidelines for this visit:  Type of Medical   Decision Making Face-to-Face Visit       within 7 Days of discharge Face-to-Face Visit        within 14 days of discharge   Moderate Complexity [id number]   High Complexity [id number]                Problem list and histories reviewed & adjusted, as indicated.  Additional history: Patient was noting that nausea persists after eating.  Stressed that he left the ER during the evaluation and NG tube with IV fluids would have been warranted.  He is amenable to Gatorade for 24-48 hours for now with escalation to full liquids after 2 days and gradual return to normal eating.  Stools are normal but dark from PeptoBismol.  Patient was angry that they asked him about Bipolar Disorder.  Reviewed the past MR with Patient and that the work up for this was recommended.  PHQ-9 and PILLO-7 obtained and reviewed.  Patient still unwilling to treat anxiety.  ETOH use reviewed.  Patient is reluctant for help in this area stating  he will stop on his own.      ROS:  Constitutional, HEENT, cardiovascular, pulmonary, gi and gu systems are negative, except as otherwise noted.      OBJECTIVE:   /75 (BP Location: Left arm, Patient Position: Chair, Cuff Size: Adult Regular)  Pulse 66  Temp 98  F (36.7  C) (Oral)  Wt 175 lb 6.4 oz (79.6 kg)  SpO2 98%  BMI 27.07 kg/m2  Body mass index is 27.07 kg/(m^2).  GENERAL: healthy, alert and no distress  NECK: no adenopathy, no asymmetry, masses, or scars and thyroid normal to palpation  RESP: lungs clear to auscultation - no rales, rhonchi or wheezes  CV: regular rate and rhythm, normal S1 S2, no S3 or S4, no murmur, click or rub, no peripheral edema and peripheral pulses strong  ABDOMEN: soft, nontender, no hepatosplenomegaly, no masses and bowel sounds normal  MS: no gross musculoskeletal defects noted, no edema  SKIN: no suspicious lesions or rashes    ASSESSMENT/PLAN:   1. Alcohol-induced acute pancreatitis, unspecified complication status  2. Need for prophylactic vaccination and inoculation against influenza      Clear liquids to full in 48 hours  Another 48 before the reintroduction of solid foods.  Reconsider  intervention and assistance  Follow up if symptoms should persist, change or worsen.  Repeat labs then if warranted.  Patient amenable to this follow up plan.     Arthur Bautista PA-C  [facility]

## 2017-10-02 NOTE — MR AVS SNAPSHOT
After Visit Summary   10/2/2017    Kenn Dobson    MRN: [id number]           Patient Information     Date Of Birth          1985        Visit Information        Provider Department      10/2/2017 11:40 AM Arthur Bautista PA-C [facility] [place]        Today's Diagnoses     Alcohol-induced acute pancreatitis, unspecified complication status    -  1    Need for prophylactic vaccination and inoculation against influenza          Care Instructions      Discharge Instructions for Acute Pancreatitis  You have been diagnosed with acute pancreatitis. Your pancreas is inflamed or swollen. The pancreas is an organ that makes digestive juices and hormones. Gallstones are a common cause of pancreatitis. These hard stones form in the gallbladder. The gallbladder shares a tube with the pancreas into the small intestine. If gallstones block this tube, fluid can t leave the pancreas. The fluid backs up and causes redness and swelling (inflammation). There are other causes of pancreatitis. Make sure you understand the cause of your pancreatitis. Then you can try to stop it from happening again.  Immediate home care    Find someone to drive you to appointments. Acute pancreatitis is a serious condition, and you should never drive if you are experiencing symptoms.    Stop drinking if your illness was caused by alcohol.    Ask your healthcare provider about alcohol abuse programs and support groups such as Alcoholics Anonymous.    Ask your provider about prescription medicines that can help you stop drinking.    Tell your provider about the alcohol withdrawal symptoms you have when you stop drinking. This is very important. You may need close medical supervision and special medicines when you stop drinking. This will depend on your alcohol withdrawal history.     Take your medicines exactly as directed. Don t skip doses.    Eat a low-fat diet. Ask your provider for menus and other diet  information.    Learn to take your own pulse. Keep a record of your results. Ask your provider which readings mean that you need medical attention.  Ongoing care    Tell your provider about any medicines you are taking. Some medicines can cause this condition.    Before starting any new medicine, ask your provider if it will harm your pancreas. This includes any new over-the-counter medicines, vitamins, or herbal supplements.      Tell your provider if you lose weight without dieting.    Be aware of symptoms that may mean your pancreatitis has come back. These symptoms include belly pain, nausea and vomiting, and fever.    Keep all follow-up appointments with your provider. Problems can often show up later.  Follow-up  Follow up with your healthcare provider, or as advised.  When to call your provider  Call your healthcare provider right away if you have any of the following:    Fever of 100.4 F (38.0 C) or higher, or as advised by your provider    Severe pain from your upper belly to your back    Nausea and vomiting    Feely dizzy or lightheaded    Yellowing of your skin or eyes (jaundice)    Bruises on your belly or back    Belly swelling and tenderness    Rapid pulse    Shallow, fast breathing   Date Last Reviewed: 8/1/2016 2000-2017 [employer]. [address] [place] [address]. All rights reserved. This information is not intended as a substitute for professional medical care. Always follow your healthcare professional's instructions.                Follow-ups after your visit        Who to contact     If you have questions or need follow up information about today's clinic visit or your schedule please contact [facility] directly at [phone].  Normal or non-critical lab and imaging results will be communicated to you by MyChart, letter or phone within 4 business days after the clinic has received the results. If you do not hear from us within 7 days, please  "contact the clinic through [employer] or phone. If you have a critical or abnormal lab result, we will notify you by phone as soon as possible.  Submit refill requests through [employer] or call your pharmacy and they will forward the refill request to us. Please allow 3 business days for your refill to be completed.          Additional Information About Your Visit        [employer] Information     [employer] lets you send messages to your doctor, view your test results, renew your prescriptions, schedule appointments and more. To sign up, go to [email or web address] . Click on \"Log in\" on the left side of the screen, which will take you to the Welcome page. Then click on \"Sign up Now\" on the right side of the page.     You will be asked to enter the access code listed below, as well as some personal information. Please follow the directions to create your username and password.     Your access code is: [email or web address]  Expires: 2017 12:32 PM     Your access code will  in 90 days. If you need help or a new code, please call your [place] clinic or [phone].        Care EveryWhere ID     This is your Care EveryWhere ID. This could be used by other organizations to access your [place] medical records  [id number]        Your Vitals Were     Pulse Temperature Pulse Oximetry BMI (Body Mass Index)          66 98  F (36.7  C) (Oral) 98% 27.07 kg/m2         Blood Pressure from Last 3 Encounters:   10/02/17 127/75   17 122/64   17 117/72    Weight from Last 3 Encounters:   10/02/17 175 lb 6.4 oz (79.6 kg)   17 184 lb (83.5 kg)   17 190 lb (86.2 kg)              Today, you had the following     No orders found for display       Primary Care Provider Office Phone # Fax #    Geraldine Keyes [phone]       [address] [facility] [address]        Equal Access to Services     HORTENSIA JASSO AH: Hadii uriel Duke, dat jiménez " anne ortezshelly harmeetbianka malaveaan ah. So [facility] [phone].    ATENCIÓN: Si patitola aldo, tiene a badillo disposición servicios gratuitos de asistencia lingüística. [place] al [phone].    We comply with applicable federal civil rights laws and [place] laws. We do not discriminate on the basis of race, color, national origin, age, disability, sex, sexual orientation, or gender identity.            Thank you!     Thank you for choosing [facility]  for your care. Our goal is always to provide you with excellent care. Hearing back from our patients is one way we can continue to improve our services. Please take a few minutes to complete the written survey that you may receive in the mail after your visit with us. Thank you!             Your Updated Medication List - Protect others around you: Learn how to safely use, store and throw away your medicines at www.disposemymeds.org.          This list is accurate as of: 10/2/17 12:32 PM.  Always use your most recent med list.                   Brand Name Dispense Instructions for use Diagnosis    cetirizine 10 MG tablet    zyrTEC    30 tablet    Take 1 tablet (10 mg) by mouth every evening    Allergic rhinitis, cause unspecified       fluticasone 50 MCG/ACT spray    FLONASE    1 Package    Spray 1-2 sprays into both nostrils daily    Allergic rhinitis, cause unspecified       Multi-vitamin Tabs tablet      Take 1 tablet by mouth daily

## 2017-10-02 NOTE — NURSING NOTE
"Chief Complaint   Patient presents with     Hospital F/U       Initial /75 (BP Location: Left arm, Patient Position: Chair, Cuff Size: Adult Regular)  Pulse 66  Temp 98  F (36.7  C) (Oral)  Wt 175 lb 6.4 oz (79.6 kg)  SpO2 98%  BMI 27.07 kg/m2 Estimated body mass index is 27.07 kg/(m^2) as calculated from the following:    Height as of 3/2/17: 5' 7.5\" (1.715 m).    Weight as of this encounter: 175 lb 6.4 oz (79.6 kg).  Medication Reconciliation: complete   Ann-Marie Arreguin CMA      "

## 2017-10-03 ASSESSMENT — ANXIETY QUESTIONNAIRES: GAD7 TOTAL SCORE: 18

## 2017-12-18 ENCOUNTER — RADIANT APPOINTMENT (OUTPATIENT)
Dept: GENERAL RADIOLOGY | Facility: CLINIC | Age: 32
End: 2017-12-18
Attending: INTERNAL MEDICINE
Payer: COMMERCIAL

## 2017-12-18 ENCOUNTER — OFFICE VISIT (OUTPATIENT)
Dept: PEDIATRICS | Facility: CLINIC | Age: 32
End: 2017-12-18
Payer: COMMERCIAL

## 2017-12-18 VITALS
WEIGHT: 164 LBS | DIASTOLIC BLOOD PRESSURE: 84 MMHG | TEMPERATURE: 98 F | SYSTOLIC BLOOD PRESSURE: 130 MMHG | BODY MASS INDEX: 25.31 KG/M2 | OXYGEN SATURATION: 100 % | HEART RATE: 87 BPM

## 2017-12-18 DIAGNOSIS — R10.11 RUQ ABDOMINAL PAIN: ICD-10-CM

## 2017-12-18 DIAGNOSIS — K85.20 ALCOHOL-INDUCED ACUTE PANCREATITIS WITHOUT INFECTION OR NECROSIS: ICD-10-CM

## 2017-12-18 DIAGNOSIS — Z09 HOSPITAL DISCHARGE FOLLOW-UP: Primary | ICD-10-CM

## 2017-12-18 DIAGNOSIS — R63.4 LOSS OF WEIGHT: ICD-10-CM

## 2017-12-18 DIAGNOSIS — R07.89 ATYPICAL CHEST PAIN: ICD-10-CM

## 2017-12-18 LAB
ALBUMIN SERPL-MCNC: 4.2 G/DL (ref 3.4–5)
ALP SERPL-CCNC: 41 U/L (ref 40–150)
ALT SERPL W P-5'-P-CCNC: 30 U/L (ref 0–70)
ANION GAP SERPL CALCULATED.3IONS-SCNC: 6 MMOL/L (ref 3–14)
AST SERPL W P-5'-P-CCNC: 19 U/L (ref 0–45)
BILIRUB SERPL-MCNC: 0.4 MG/DL (ref 0.2–1.3)
BUN SERPL-MCNC: 19 MG/DL (ref 7–30)
CALCIUM SERPL-MCNC: 9 MG/DL (ref 8.5–10.1)
CHLORIDE SERPL-SCNC: 105 MMOL/L (ref 94–109)
CO2 SERPL-SCNC: 30 MMOL/L (ref 20–32)
CREAT SERPL-MCNC: 1.18 MG/DL (ref 0.66–1.25)
ERYTHROCYTE [DISTWIDTH] IN BLOOD BY AUTOMATED COUNT: 12.3 % (ref 10–15)
GFR SERPL CREATININE-BSD FRML MDRD: 72 ML/MIN/1.7M2
GLUCOSE SERPL-MCNC: 93 MG/DL (ref 70–99)
HCT VFR BLD AUTO: 47.1 % (ref 40–53)
HGB BLD-MCNC: 16.5 G/DL (ref 13.3–17.7)
LIPASE SERPL-CCNC: 318 U/L (ref 73–393)
MCH RBC QN AUTO: 28.5 PG (ref 26.5–33)
MCHC RBC AUTO-ENTMCNC: 35 G/DL (ref 31.5–36.5)
MCV RBC AUTO: 81 FL (ref 78–100)
PLATELET # BLD AUTO: 221 10E9/L (ref 150–450)
POTASSIUM SERPL-SCNC: 4 MMOL/L (ref 3.4–5.3)
PROT SERPL-MCNC: 7.7 G/DL (ref 6.8–8.8)
RBC # BLD AUTO: 5.79 10E12/L (ref 4.4–5.9)
SODIUM SERPL-SCNC: 141 MMOL/L (ref 133–144)
TSH SERPL DL<=0.005 MIU/L-ACNC: 1.36 MU/L (ref 0.4–4)
WBC # BLD AUTO: 6.1 10E9/L (ref 4–11)

## 2017-12-18 PROCEDURE — 85027 COMPLETE CBC AUTOMATED: CPT | Performed by: INTERNAL MEDICINE

## 2017-12-18 PROCEDURE — 99214 OFFICE O/P EST MOD 30 MIN: CPT | Performed by: INTERNAL MEDICINE

## 2017-12-18 PROCEDURE — 83690 ASSAY OF LIPASE: CPT | Performed by: INTERNAL MEDICINE

## 2017-12-18 PROCEDURE — 71020 XR CHEST 2 VW: CPT | Performed by: RADIOLOGY

## 2017-12-18 PROCEDURE — 36415 COLL VENOUS BLD VENIPUNCTURE: CPT | Performed by: INTERNAL MEDICINE

## 2017-12-18 PROCEDURE — 80053 COMPREHEN METABOLIC PANEL: CPT | Performed by: INTERNAL MEDICINE

## 2017-12-18 PROCEDURE — 84443 ASSAY THYROID STIM HORMONE: CPT | Performed by: INTERNAL MEDICINE

## 2017-12-18 NOTE — MR AVS SNAPSHOT
After Visit Summary   12/18/2017    Kenn Dobson    MRN: [id number]           Patient Information     Date Of Birth          1985        Visit Information        Provider Department      12/18/2017 10:10 AM Patricia Lira MD PhD [facility]        Today's Diagnoses     Hospital discharge follow-up    -  1    Alcohol-induced acute pancreatitis without infection or necrosis        RUQ abdominal pain        Atypical chest pain        Loss of weight          Care Instructions    Make appointment(s) for:   -- get labs and xray done today.   -- abdominal ultrasound.               Follow-ups after your visit        Future tests that were ordered for you today     Open Future Orders        Priority Expected Expires Ordered    XR Chest 2 Views Routine 12/18/2017 12/18/2018 12/18/2017    US Abdomen Complete Routine  12/18/2018 12/18/2017            Who to contact     If you have questions or need follow up information about today's clinic visit or your schedule please contact [facility] directly at [phone].  Normal or non-critical lab and imaging results will be communicated to you by MyChart, letter or phone within 4 business days after the clinic has received the results. If you do not hear from us within 7 days, please contact the clinic through [employer] or phone. If you have a critical or abnormal lab result, we will notify you by phone as soon as possible.  Submit refill requests through [employer] or call your pharmacy and they will forward the refill request to us. Please allow 3 business days for your refill to be completed.          Additional Information About Your Visit        Care EveryWhere ID     This is your Care EveryWhere ID. This could be used by other organizations to access your [place] medical records  [id number]        Your Vitals Were     Pulse Temperature Pulse Oximetry BMI (Body Mass Index)          87 98  F (36.7  C) (Temporal) 100% 25.31  kg/m2         Blood Pressure from Last 3 Encounters:   12/18/17 130/84   10/02/17 127/75   08/30/17 122/64    Weight from Last 3 Encounters:   12/18/17 164 lb (74.4 kg)   10/02/17 175 lb 6.4 oz (79.6 kg)   06/13/17 184 lb (83.5 kg)              We Performed the Following     CBC with platelets     Comprehensive metabolic panel (BMP + Alb, Alk Phos, ALT, AST, Total. Bili, TP)     Lipase     TSH with free T4 reflex        Primary Care Provider Office Phone # Fax #    Geraldine Priya Keyes [phone]       [address] [facility] [address]        Equal Access to Services     HORTENSIA JASSO : Hadmarie Mercado, waeddie dexter, qaybta kaalmada rita, dat castro. So [facility] [phone].    ATENCIÓN: Si habla español, tiene a badillo disposición servicios gratuitos de asistencia lingüística. Llame al [phone].    We comply with applicable federal civil rights laws and [place] laws. We do not discriminate on the basis of race, color, national origin, age, disability, sex, sexual orientation, or gender identity.            Thank you!     Thank you for choosing [facility]  for your care. Our goal is always to provide you with excellent care. Hearing back from our patients is one way we can continue to improve our services. Please take a few minutes to complete the written survey that you may receive in the mail after your visit with us. Thank you!             Your Updated Medication List - Protect others around you: Learn how to safely use, store and throw away your medicines at www.disposemymeds.org.          This list is accurate as of: 12/18/17 11:06 AM.  Always use your most recent med list.                   Brand Name Dispense Instructions for use Diagnosis    cetirizine 10 MG tablet    zyrTEC    30 tablet    Take 1 tablet (10 mg) by mouth every evening    Allergic rhinitis, cause unspecified       fluticasone 50 MCG/ACT spray    FLONASE     1 Package    Spray 1-2 sprays into both nostrils daily    Allergic rhinitis, cause unspecified       Multi-vitamin Tabs tablet      Take 1 tablet by mouth daily

## 2017-12-18 NOTE — PROGRESS NOTES
SUBJECTIVE:   Kenn Dobson is a 32 year old male who presents to clinic today for the following health issues:      New Patient/Transfer of Care  Pancreatitis Oct, Seen at [facility] 10-2-17. Weight loss since then. Fatigue and weak.       Patient was admitted to [facility] for acute pancreatitis. He admitted he had alcohol and marijuana. He quit both completely since the hospital visit. 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  Since then patient has continued to lose weight.  He reports that he weight 200 pounds a year ago this time.  When he went to the hospital he was 175 pounds.  He continues to have a jabbing pain in the right upper quadrant, right behind the rib cage.  Reports that the ribs themselves do not hurt.  Denies any injury to the ribs.    Prior to the hospitalization, he drinks periodically about once every 90 days.  Prior to the admission he was binge drinking and was drunk.  He smokes marijuana only occasionally.  He realized that was bad life choice.  He has quit both alcohol and marijuana.    Patient also is quite upset at having bipolar as a diagnosis on his chart.  He reports that he went to see a doctor one time, because he was a little anxious at the time.  Somehow the doctor put bipolar on his chart.  Patient denies having any problems with depression, anxiety or bipolar.  On our record under the past medical history there were the diagnosis of anxiety schizophrenia and bipolar.  However I am not sure how this diagnosis were obtained.  None of his clinic visits with our care system pertains to this.    Family history: His grandfather  from liver cancer.  He does not know the other family member very well.    ROS:  C: Negative for fever. Positive chills and weight loss.   I:   Negative for new rash  HEENT: Negative for eye, nose, mouth problems.   R:   Negative for cough, no SOB. Feels weaker when walking dogs.   CV: Negative for chest pain or  palpitation  GI:   Negative for abdominal pain, nausea, vomiting; has constipation and hemorrhoids.   : Negative for dysuria, frequency or urgency  MS: positive for joint pain, right shoulder, collar bone, knee pain bilaterally, history of meniscal tear, hand joints, neck joint, used to be wrestler, still coaches wresting, sees sports medicine for the MSK issues.   N:    Negative for dizziness, paresthesia, weakness; positive for stress headache and lack of sleep, has 2 young kids, ages 2 and 9 months. Sleeps average 3-4 hours a night. Has a lot of responsibilities with work and family.   Psy: Negative for change in mood. Normal anxiety people feels with work stress. He doesn't think this is abnormal.     Patient Active Problem List   Diagnosis     Cauliflower right ear     ED (erectile dysfunction)     Internal hemorrhoid     Multiple joint pain     History reviewed. No pertinent surgical history.    Social History   Substance Use Topics     Smoking status: Former Smoker     Smokeless tobacco: Current User     Alcohol use Yes      Comment: Drinks daily     History reviewed. No pertinent family history.      Current Outpatient Prescriptions   Medication Sig Dispense Refill     multivitamin, therapeutic with minerals (MULTI-VITAMIN) TABS tablet Take 1 tablet by mouth daily       cetirizine (ZYRTEC) 10 MG tablet Take 1 tablet (10 mg) by mouth every evening 30 tablet 3     fluticasone (FLONASE) 50 MCG/ACT nasal spray Spray 1-2 sprays into both nostrils daily 1 Package 11        Problem list, Medication list, Allergies, and Medical/Social/Surgical histories reviewed in [facility] and updated as appropriate.    OBJECTIVE:                                                    /84  Pulse 87  Temp 98  F (36.7  C) (Temporal)  Wt 164 lb (74.4 kg)  SpO2 100%  BMI 25.31 kg/m2    GENERAL: healthy, alert and no distress  HEENT: unremarkable  Neck: no adenopathy/mass/stiffness. Thyroid normal.  Lung: clear, no  wheezing/rhonchi/crackles  Heart: RRR, normal S1/2, no murmur/gallop/rup  Chest wall: There is no tenderness to palpation over the anterior chest wall.  Patient points to the lower one third of the rib cage area, indicating below that is when he feels the pain.  It does not hurt to press on the rib cage.  Pressing on it actually makes it feel better.   Abd: soft, normal BS, non tender, no organomegaly   Ext: no cyanosis/clubbing/edema  Neuro: alert and oriented, non focal  Skin: no worrisome rash.           ASSESSMENT/PLAN:                                                      32 year old male with the following diagnoses and treatment plan:      ICD-10-CM    1. Hospital discharge follow-up Z09    2. Alcohol-induced acute pancreatitis without infection or necrosis K85.20 CBC with platelets     Comprehensive metabolic panel (BMP + Alb, Alk Phos, ALT, AST, Total. Bili, TP)     Lipase   3. RUQ abdominal pain R10.11 US Abdomen Complete   4. Atypical chest pain R07.89 XR Chest 2 Views   5. Loss of weight R63.4 TSH with free T4 reflex       Recent hospital discharge for alcohol induced pancreatitis.  We will recheck his labs.  Significant weight loss, etiology unclear.  Will check a TSH as well.  We will also check chest x-ray and abdominal ultrasound to evaluate this persistent right upper quadrant pain.     Will call or return to clinic if worsening or symptoms not improving as discussed.  See Patient Instructions.        Patricia Lira MD-PhD  [facility]    (Note: Chart documentation was done in part with Dragon Voice Recognition software. Although reviewed after completion, some word and grammatical errors may remain.)

## 2017-12-18 NOTE — NURSING NOTE
"Chief Complaint   Patient presents with     Establish Care     Pancreatitis Oct, Seen at [facility] 10-2-17. Weight loss since then. Fatigue and weak.        Initial /84  Pulse 87  Temp 98  F (36.7  C) (Temporal)  Wt 164 lb (74.4 kg)  SpO2 100%  BMI 25.31 kg/m2 Estimated body mass index is 25.31 kg/(m^2) as calculated from the following:    Height as of 3/2/17: 5' 7.5\" (1.715 m).    Weight as of this encounter: 164 lb (74.4 kg).  Medication Reconciliation: complete    "

## 2017-12-20 ENCOUNTER — RADIANT APPOINTMENT (OUTPATIENT)
Dept: ULTRASOUND IMAGING | Facility: CLINIC | Age: 32
End: 2017-12-20
Attending: INTERNAL MEDICINE
Payer: COMMERCIAL

## 2017-12-20 ENCOUNTER — TELEPHONE (OUTPATIENT)
Dept: PEDIATRICS | Facility: CLINIC | Age: 32
End: 2017-12-20

## 2017-12-20 DIAGNOSIS — R10.11 RUQ ABDOMINAL PAIN: ICD-10-CM

## 2017-12-20 PROCEDURE — 76700 US EXAM ABDOM COMPLETE: CPT | Performed by: RADIOLOGY

## 2017-12-20 NOTE — TELEPHONE ENCOUNTER
[facility] Call Center    Phone Message    Name of Caller: Kenn    Phone Number: Home number on file [phone] (home)    Best time to return call: any    May a detailed message be left on voicemail: yes    Relation to patient: Self    Reason for Call: Other: Patient is calling requesting to speak with the clinic regarding his Ultra Sound he had done today. Please advise.     Action Taken: Message routed to:  Primary Care p [phone]

## 2017-12-20 NOTE — LETTER
January 2, 2018      Kenn Meltonce  [address] [facility] [address]              Dear Howard Hawk is a copy of your test results.    -- abdominal ultrasound showed that the liver has a starry jey pattern of sound wave reflection. This is not specific. Radiologist indicated this is commonly seen in acute hepatitis (which you have), fasting liver (people in fasting state), or medications. It is less likely to be bad things like heart failure, leukemia, or lymphoma.   -- This likely reflects the recent alcoholic hepatitis you had. We can also do additional testing to rule out viral induced hepatitis such as hepatitis B, C.   -- Please schedule a follow up appointment to review this, recheck your weight, symptoms, and discuss additional testing.     If you have additional question, please call or make a follow-up appointment.    US Abdomen Complete   Status:  Final result   Visible to patient:  No (Not Released) Dx:  RUQ abdominal pain Order: [id number]             Details        Reading Physician Reading Date Result Priority       Kayleen Galvan MD 12/20/2017            Narrative             EXAMINATION: US ABDOMEN COMPLETE, 12/20/2017 11:55 AM     COMPARISON: None.    HISTORY: 32 years old with right upper quadrant abdominal pain.    TECHNIQUE: The abdomen was scanned in standard fashion with  specialized ultrasound transducer(s) using both grey scale and limited  color Doppler techniques.    Findings:  Pancreas: Visualized portions of the head and body of the pancreas are  within normal limits.   Aorta and IVC:  The visualized portions are not dilated.   Liver There is increased conspicuity of the portal venules in the  setting of diffuse decreased parenchymal echogenicity, in a diffuse  starry jey parenchymal pattern. No focal mass. The liver measures 14.8  cm in sagittal dimension.: The main portal vein is patent with  antegrade flow, measuring 1.4 cm.  Gallbladder: There is no wall thickening,  pericholecystic fluid, nor  sonographic Llanos's sign. No cholelithiasis.  Bile Ducts: There is no intrahepatic bile duct dilation.  The common  bile duct measures 5 mm in diameter.  Kidneys: No hydronephrosis.  The craniocaudal dimensions are: right-  9.8 cm, left- 10 cm.  Spleen: The spleen is normal in size,  measuring 9.5 cm in sagittal  dimension.  Fluid: No evidence of ascites or pleural effusions.             Impression             Impression: Starry jey liver parenchymal pattern is nonspecific. This  is commonly seen with acute hepatitis, fasting liver, medications, and  less likely right heart failure, leukemia, lymphoma.    SINGH GUILLORY MD               Last Resulted: 12/20/17  1:09 PM                           Sincerely,    Dr. Patricia Lira

## 2017-12-22 NOTE — TELEPHONE ENCOUNTER
Left message for patient to return call. I will speak with him on Friday on the result as he requested direct call from provider.

## 2017-12-29 NOTE — TELEPHONE ENCOUNTER
Left message with patient that I will send him result note on the ultrasound as I was not able to speak with him directly. Call if he has further questions.

## 2017-12-29 NOTE — PROGRESS NOTES
Send letter with the following comment:         Dear Kenn     Enclosed is a copy of your test results.    -- abdominal ultrasound showed that the liver has a starry jey pattern of sound wave reflection. This is not specific. Radiologist indicated this is commonly seen in acute hepatitis (which you have), fasting liver (people in fasting state), or medications. It is less likely to be bad things like heart failure, leukemia, or lymphoma.   -- This likely reflects the recent alcoholic hepatitis you had. We can also do additional testing to rule out viral induced hepatitis such as hepatitis B, C.   -- Please schedule a follow up appointment to review this, recheck your weight, symptoms, and discuss additional testing.     If you have additional question, please call or make a follow-up appointment.    Patricia Lira MD-PhD

## 2018-01-26 ENCOUNTER — OFFICE VISIT (OUTPATIENT)
Dept: PEDIATRICS | Facility: CLINIC | Age: 33
End: 2018-01-26
Payer: COMMERCIAL

## 2018-01-26 ENCOUNTER — RADIANT APPOINTMENT (OUTPATIENT)
Dept: GENERAL RADIOLOGY | Facility: CLINIC | Age: 33
End: 2018-01-26
Attending: INTERNAL MEDICINE
Payer: COMMERCIAL

## 2018-01-26 VITALS
WEIGHT: 175 LBS | DIASTOLIC BLOOD PRESSURE: 70 MMHG | OXYGEN SATURATION: 98 % | SYSTOLIC BLOOD PRESSURE: 110 MMHG | TEMPERATURE: 96.9 F | BODY MASS INDEX: 27 KG/M2 | HEART RATE: 78 BPM

## 2018-01-26 DIAGNOSIS — M25.561 CHRONIC PAIN OF RIGHT KNEE: ICD-10-CM

## 2018-01-26 DIAGNOSIS — R10.11 RUQ ABDOMINAL PAIN: Primary | ICD-10-CM

## 2018-01-26 DIAGNOSIS — R07.81 RIB PAIN ON RIGHT SIDE: ICD-10-CM

## 2018-01-26 DIAGNOSIS — G89.29 CHRONIC PAIN OF RIGHT KNEE: ICD-10-CM

## 2018-01-26 PROCEDURE — 99213 OFFICE O/P EST LOW 20 MIN: CPT | Performed by: INTERNAL MEDICINE

## 2018-01-26 PROCEDURE — 71101 X-RAY EXAM UNILAT RIBS/CHEST: CPT | Mod: RT | Performed by: RADIOLOGY

## 2018-01-26 NOTE — PATIENT INSTRUCTIONS
Make appointment(s) for:   -- rib xray today  -- knee MRI      See referral for [place] [facility]

## 2018-01-26 NOTE — PROGRESS NOTES
SUBJECTIVE:   Kenn Dobson is a 32 year old male who presents to clinic today for the following health issues:      Follow up liver tests and results, also right knee pain    Has intermittent RUQ pain after eating with things with sugar like a donut, or caffeine or sometimes he can be related to drinking a sip of water.  Other times the pain will act up when he is physically exhausted or when he wrestles. He is a . He wants to be able to stay active, do his job and able to eat donut once a while but the discomfort is very bothersome to him.     He went to the ED last December for this pain, but got worked up on pancreatitis, had alcohol induced hepatitis at the time. However this pain was never addressed by ED. I saw him for hospital follow-up appointment in December.  At that time I felt that the pain in the right side is more musculoskeletal in nature.  The location of the pain is in the rib cage itself below the right nipple.  However patient is not reassured with this.  He has had chest x-ray and abdominal ultrasound.  Chest x-ray was completely normal.  Abdominal ultrasound has started to jey type pattern in the parenchyma.  Could be from fasting liver.  No other specific pathology found.    Right knee pain, saw sports medicine Dr. Ospina last year.  He had knee x-ray, cortisone injection.  He was told that if the pain does not get better after the second injection, consider advanced imaging.  His last injection was in August.  He feels that he may have slight improvement but with his job and wrestling, as a constant aggravation.  Sometimes is troublesome when he went walks for long periods of time.      Current Outpatient Prescriptions on File Prior to Visit:  multivitamin, therapeutic with minerals (MULTI-VITAMIN) TABS tablet Take 1 tablet by mouth daily   fluticasone (FLONASE) 50 MCG/ACT nasal spray Spray 1-2 sprays into both nostrils daily   cetirizine (ZYRTEC) 10 MG tablet Take 1  tablet (10 mg) by mouth every evening     No current facility-administered medications on file prior to visit.     Past Medical History:   Diagnosis Date     Anxiety      Bipolar affective (H)      Emotional disorder      Schizoaffective disorder (H)       History reviewed. No pertinent surgical history.  Family History   Problem Relation Age of Onset     Liver Cancer Maternal Grandfather       Social History   Substance Use Topics     Smoking status: Former Smoker     Smokeless tobacco: Current User     Alcohol use Yes      Comment: Drinks daily       Problem list, Medication list, Allergies, and Medical/Social/Surgical histories reviewed in EPIC and updated as appropriate.    OBJECTIVE:                                                    /70  Pulse 78  Temp 96.9  F (36.1  C) (Temporal)  Wt 175 lb (79.4 kg)  SpO2 98%  BMI 27 kg/m2    GEN: healthy, alert and no distress  Chest wall: pt keeps pressing on the right chest wall just below the nipple area as area of pain but pressing on it makes it actually better.   Right knee: no effusion, tender over medial joint line and lateral jointline. Flexion/extension intact.           ASSESSMENT/PLAN:                                                      32 year old male with the following diagnoses and treatment plan:      ICD-10-CM    1. RUQ abdominal pain R10.11 GASTROENTEROLOGY ADULT REF CONSULT ONLY   2. Chronic pain of right knee M25.561 MR Knee Right w Contrast    G89.29    3. Rib pain on right side R07.81 XR Ribs & Chest Right G/E 3 Views       -- right chest wall/rib pain: probably has old injury. Rib xray.  -- pt reports pain in the same area of the chest wall when eating: I'm not certain what the correlation is but I think having him see GI may be beneficial.   -- right knee pain: will obtain knee MRI and refer back to sports medicine.     Will call or return to clinic if worsening or symptoms not improving as discussed.  See Patient  Instructions.      Patricia Lira MD-PhD  [facility]    (Note: Chart documentation was done in part with Dragon Voice Recognition software. Although reviewed after completion, some word and grammatical errors may remain.)

## 2018-01-26 NOTE — PROGRESS NOTES
Send letter with the following comment:         Dear Kenn,     Enclosed is a copy of your test results.    -- ribs has no fracture. This is good news.   -- please review with Dr. Ospina if there may be other factor to consider with the chest wall pain when you exercise/wrestle/bike.     If you have additional question, please call or make a follow-up appointment.    Patricia Lira MD-PhD

## 2018-01-26 NOTE — MR AVS SNAPSHOT
After Visit Summary   1/26/2018    Kenn Dobson    MRN: [id number]           Patient Information     Date Of Birth          1985        Visit Information        Provider Department      1/26/2018 12:10 PM Patricia Lira MD PhD [facility]        Today's Diagnoses     RUQ abdominal pain    -  1    Chronic pain of right knee        Rib pain on right side          Care Instructions    Make appointment(s) for:   -- rib xray today  -- knee MRI      See referral for [facility]                 Follow-ups after your visit        Additional Services     GASTROENTEROLOGY ADULT REF CONSULT ONLY       Preferred Location: [facility] [phone]      Please be aware that coverage of these services is subject to the terms and limitations of your health insurance plan.  Call member services at your health plan with any benefit or coverage questions.  Any procedures must be performed at a [facility] facility OR coordinated by your clinic's referral office.    Please bring the following with you to your appointment:    (1) Any X-Rays, CTs or MRIs which have been performed.  Contact the facility where they were done to arrange for  prior to your scheduled appointment.    (2) List of current medications   (3) This referral request   (4) Any documents/labs given to you for this referral                  Future tests that were ordered for you today     Open Future Orders        Priority Expected Expires Ordered    XR Ribs & Chest Right G/E 3 Views Routine 1/26/2018 1/26/2019 1/26/2018    MR Knee Right w Contrast Routine  1/26/2019 1/26/2018            Who to contact     If you have questions or need follow up information about today's clinic visit or your schedule please contact [facility] directly at [phone].  Normal or non-critical lab and imaging results will be communicated to you by MyChart, letter or phone within 4 business days after the clinic has received  the results. If you do not hear from us within 7 days, please contact the clinic through Summlyt or phone. If you have a critical or abnormal lab result, we will notify you by phone as soon as possible.  Submit refill requests through [email or web address] or call your pharmacy and they will forward the refill request to us. Please allow 3 business days for your refill to be completed.          Additional Information About Your Visit        Care EveryWhere ID     This is your Care EveryWhere ID. This could be used by other organizations to access your [facility] medical records  [id number]        Your Vitals Were     Pulse Temperature Pulse Oximetry BMI (Body Mass Index)          78 96.9  F (36.1  C) (Temporal) 98% 27 kg/m2         Blood Pressure from Last 3 Encounters:   01/26/18 110/70   12/18/17 130/84   10/02/17 127/75    Weight from Last 3 Encounters:   01/26/18 175 lb (79.4 kg)   12/18/17 164 lb (74.4 kg)   10/02/17 175 lb 6.4 oz (79.6 kg)              We Performed the Following     GASTROENTEROLOGY ADULT REF CONSULT ONLY        Primary Care Provider Office Phone # Fax #    Geraldine Keyes [phone]       [address] [place] [address]        Equal Access to Services     HORTENSIA JASSO : Hadii theron pruetto Sotramaineali, waaxda luqadaha, qaybta kaalmada adeegyada, dat castro. So [facility] [phone].    ATENCIÓN: Si habla español, tiene a badillo disposición servicios gratuitos de asistencia lingüística. Llame al [phone].    We comply with applicable federal civil rights laws and [place] laws. We do not discriminate on the basis of race, color, national origin, age, disability, sex, sexual orientation, or gender identity.            Thank you!     Thank you for choosing [facility]  for your care. Our goal is always to provide you with excellent care. Hearing back from our patients is one way we can continue to improve our services. Please take a few  minutes to complete the written survey that you may receive in the mail after your visit with us. Thank you!             Your Updated Medication List - Protect others around you: Learn how to safely use, store and throw away your medicines at www.disposemymeds.org.          This list is accurate as of 1/26/18 12:43 PM.  Always use your most recent med list.                   Brand Name Dispense Instructions for use Diagnosis    cetirizine 10 MG tablet    zyrTEC    30 tablet    Take 1 tablet (10 mg) by mouth every evening    Allergic rhinitis, cause unspecified       fluticasone 50 MCG/ACT spray    FLONASE    1 Package    Spray 1-2 sprays into both nostrils daily    Allergic rhinitis, cause unspecified       Multi-vitamin Tabs tablet      Take 1 tablet by mouth daily        UNABLE TO FIND      MEDICATION NAME: Tumeric        VITAMIN D (CHOLECALCIFEROL) PO      Take 1,000 Units by mouth daily

## 2018-01-26 NOTE — NURSING NOTE
"Chief Complaint   Patient presents with     RECHECK     Follow up liver tests and results       Initial /70  Pulse 78  Temp 96.9  F (36.1  C) (Temporal)  Wt 175 lb (79.4 kg)  SpO2 98%  BMI 27 kg/m2 Estimated body mass index is 27 kg/(m^2) as calculated from the following:    Height as of 3/2/17: 5' 7.5\" (1.715 m).    Weight as of this encounter: 175 lb (79.4 kg).  Medication Reconciliation: complete    "

## 2018-02-06 ENCOUNTER — RADIANT APPOINTMENT (OUTPATIENT)
Dept: MRI IMAGING | Facility: CLINIC | Age: 33
End: 2018-02-06
Attending: INTERNAL MEDICINE
Payer: COMMERCIAL

## 2018-02-06 DIAGNOSIS — G89.29 CHRONIC PAIN OF RIGHT KNEE: ICD-10-CM

## 2018-02-06 DIAGNOSIS — M23.203 OLD COMPLEX TEAR OF MEDIAL MENISCUS OF RIGHT KNEE: Primary | ICD-10-CM

## 2018-02-06 DIAGNOSIS — M25.561 CHRONIC PAIN OF RIGHT KNEE: ICD-10-CM

## 2018-02-06 PROCEDURE — 73721 MRI JNT OF LWR EXTRE W/O DYE: CPT | Mod: RT | Performed by: RADIOLOGY

## 2018-02-07 ENCOUNTER — TELEPHONE (OUTPATIENT)
Dept: PEDIATRICS | Facility: CLINIC | Age: 33
End: 2018-02-07

## 2018-02-07 ENCOUNTER — TELEPHONE (OUTPATIENT)
Dept: ORTHOPEDICS | Facility: CLINIC | Age: 33
End: 2018-02-07

## 2018-02-07 ENCOUNTER — TRANSFERRED RECORDS (OUTPATIENT)
Dept: HEALTH INFORMATION MANAGEMENT | Facility: CLINIC | Age: 33
End: 2018-02-07

## 2018-02-07 NOTE — TELEPHONE ENCOUNTER
Patient had an MRI ordered by Dr. Lira is PCP for his knee. He states that he has been following up with Dr. Ospina regarding the same issue and would like to request that Dr. Ospina review the MRI results as well and call with any concerns. Please advise.

## 2018-02-07 NOTE — TELEPHONE ENCOUNTER
Patient informed of MRI results.  He requested these be sent to Dr.David Ospina.  Informed patient that he can get these in his chart.  He would like Dr. Ospina to be notified of these results.  Routed to [facility] per patient request.    Flores Thomas RN,   [facility]

## 2018-02-07 NOTE — TELEPHONE ENCOUNTER
Attempt 1    LM letting patient know call is regarding MRI results. Asked patient to return clinics call.     MR Knee Right w/o Contrast   Status:  Final result   Visible to patient:  No (Not Released) Dx:  Chronic pain of right knee Order: [id number]       Notes Recorded by Patricia Lira MD PhD on 2/7/2018 at 8:02 AM  Please let pt know that there is complex meniscal tear and cartilage abnormalities. These are likely from prior injuries he has had. I recommend he sees orthopedics for evaluation and possibly getting arthroscope.       Details        Reading Physician Reading Date Result Priority       Ellermann, Jutta M, MD Wang, Xiao, MD 2/6/2018 2/6/2018            Narrative             MR right knee without contrast 2/6/2018 10:28 AM    Techniques: Multiplanar multisequence imaging of the right  knee was  obtained without  administration of intra-articular or intravenous  contrast using routing protocol.    History: right knee pain, failing cortisone injection; Chronic pain of  right knee; Chronic pain of right knee     Comparison: Right knee radiograph dated 3/2/2017    Findings:    MENISCI:  Medial meniscus: Complex degenerative tearing of the medial meniscus  with horizontal oblique tear of the posterior body extending to the  posterior horn attachment with parrot-beak appearance superimposed on  a full-thickness radial tear.  Lateral meniscus: Intact.    LIGAMENTS  Cruciate ligaments: Intact.  Medial supporting structures: Intact.  Lateral supporting structures: Intact.    EXTENSOR MECHANISM  Intact.    FLUID  Mild to moderate joint effusion. Popliteal cyst measures 1.8 x 1.4 cm.    OSSEOUS and ARTICULAR STRUCTURES  Bones: No fracture, contusion, or osseous lesion is seen.    Patellofemoral compartment: No hyaline cartilage disease.    Medial compartment: Multiple focal areas of near full-thickness  fissuring of the weightbearing surface of the medial femoral condyle  and medial tibial plateau with adjacent  subchondral edema and early  subtle subchondral insufficiency fracture.    Lateral compartment: No high-grade chondromalacia.    ANCILLARY FINDINGS  None.             Impression             Impression:  1. Complex degenerative tearing of the medial meniscus with horizontal  oblique tear of the body extending to the posterior horn attachment  superimposed on a full-thickness radial tear. Medial compartment  high-grade cartilage abnormalities with multifocal full-thickness  fissuring and areas of near full-thickness cartilage loss of the  weightbearing surface of the medial femoral condyle and medial tibial  plateau with adjacent subchondral edema and early subtle subchondral  insufficiency fracture.  2. Lateral and patellofemoral compartment are relatively intact.  3.Mild to moderate joint effusion.  4. Small popliteal cyst measures up to 1.8 cm.    I have personally reviewed the examination and initial interpretation  and I agree with the findings.    JUTTA ELLERMANN, MD               Last Resulted: 02/06/18  1:49 PM                     Samantha Mares RN

## 2018-02-08 NOTE — TELEPHONE ENCOUNTER
Per huddle with Dr. Ospina he would recommend patient see Dr. Gutierrez for surgical consult if his knee is still bothering him. Kenn would like to go ahead and schedule an appointment with Dr. Gutierrez. Appointment made for next Thursday 2/15/18

## 2018-02-13 ENCOUNTER — PRE VISIT (OUTPATIENT)
Dept: ORTHOPEDICS | Facility: CLINIC | Age: 33
End: 2018-02-13

## 2018-02-13 NOTE — TELEPHONE ENCOUNTER
Pt reports being seen for : Right knee meniscus tear  1. Do you have recent xrays (if not seen in EPIC)? Yes 3/22/17  2. Do you have any MRI's (if not seen in EPIC)?Yes - Confirmed in EPIC.  3. Have you had surgery in the past for the same issue?No.   4. Are you being referred by another provider? Yes: Dr. Ospina and Dr. Lira  If yes-Records in Epic.  5. Is this work comp or MVA related? No.   Jonelle Shrestha RN

## 2018-06-23 ENCOUNTER — TELEPHONE (OUTPATIENT)
Dept: PEDIATRICS | Facility: CLINIC | Age: 33
End: 2018-06-23

## 2018-06-23 ENCOUNTER — NURSE TRIAGE (OUTPATIENT)
Dept: NURSING | Facility: CLINIC | Age: 33
End: 2018-06-23

## 2018-06-23 DIAGNOSIS — K64.8 INTERNAL HEMORRHOID: ICD-10-CM

## 2018-06-23 NOTE — TELEPHONE ENCOUNTER
Reason for call:  Medication   If this is a refill request, has the caller requested the refill from the pharmacy already? No  Will the patient be using a [facility]? No  Name of the pharmacy and phone number for the current request: [facility]    Name of the medication requested:Medication for hemorrhoids, called in a while ago, gray hasn't seen the order yet. Wondering what can be done to get the medication.  Other request:no    Phone number to reach patient:  Home number on file [phone] (home)    Best Time:  anytime    Can we leave a detailed message on this number?  YES

## 2018-06-23 NOTE — TELEPHONE ENCOUNTER
Apple Kenn's wife called again requesting a medication that Kenn has had in the past for hemorrhoids.  Dr AMANDEEP Lira is Kenn's pcp. I checked to see if Dr Lira is by chance on call today as I would page him if he is. He is not the on call today.I gave Apple this information. I told her too that Kenn can go to an UC today or tomorrow for his hemorrhoids and possibly get a medication for this.Apple will let Kenn know this.   Mariah TONY RN [facility]

## 2018-06-25 RX ORDER — HYDROCORTISONE ACETATE 25 MG/1
25 SUPPOSITORY RECTAL 2 TIMES DAILY PRN
Qty: 20 SUPPOSITORY | Refills: 0 | Status: SHIPPED | OUTPATIENT
Start: 2018-06-25 | End: 2021-12-28

## 2018-06-25 NOTE — TELEPHONE ENCOUNTER
I sent an Rx for him.   I saw him a couple of times only for abdominal pain but not about hemorrhoid problem. I recommend he schedules for a physical in the near future.

## 2018-06-25 NOTE — TELEPHONE ENCOUNTER
Routing refill request to provider for review/approval because:  Drug not active on patient's medication list      hydrocortisone (ANUSOL-HC) 25 MG suppository (Discontinued) 40 suppository 1 5/30/2014 6/4/2014 --      Sig - Route: Place 1 suppository (25 mg) rectally 2 times daily as needed for hemorrhoids - Rectal       Last office visit:  1/26/18        Flores Thomas RN,   [facility]

## 2018-08-03 ENCOUNTER — OFFICE VISIT (OUTPATIENT)
Dept: URGENT CARE | Facility: URGENT CARE | Age: 33
End: 2018-08-03
Payer: COMMERCIAL

## 2018-08-03 VITALS
OXYGEN SATURATION: 99 % | SYSTOLIC BLOOD PRESSURE: 145 MMHG | BODY MASS INDEX: 27 KG/M2 | WEIGHT: 175 LBS | HEART RATE: 66 BPM | DIASTOLIC BLOOD PRESSURE: 82 MMHG | RESPIRATION RATE: 18 BRPM | TEMPERATURE: 98.4 F

## 2018-08-03 DIAGNOSIS — K62.89 RECTAL PAIN: Primary | ICD-10-CM

## 2018-08-03 PROCEDURE — 99213 OFFICE O/P EST LOW 20 MIN: CPT | Performed by: PHYSICIAN ASSISTANT

## 2018-08-03 RX ORDER — HYDROCORTISONE ACETATE 25 MG/1
25 SUPPOSITORY RECTAL 2 TIMES DAILY
Qty: 24 SUPPOSITORY | Refills: 1 | Status: SHIPPED | OUTPATIENT
Start: 2018-08-03 | End: 2021-12-28

## 2018-08-03 NOTE — PROGRESS NOTES
HPI:    Kenn Dobson is an 33 year old male who presents for evaluation of rectal pain, worse last few months. So painful to have a bowel movement. Just wants a refill of Anusol HC. Chart reviewed. Has edgardo to the ER for it. Has had CT to rule out perirectal abscess. Wants referral to colorectal for it. Occasional blood with bowel movement    Exam:    /82  Pulse 66  Temp 98.4  F (36.9  C) (Oral)  Resp 18  Wt 175 lb (79.4 kg)  SpO2 99%  BMI 27 kg/m2    Gen: Healthy appearing male in no acute distress  Eyes: Conjunctiva and sclera normal. Pupils react normally to light. No nystagmus.  Neck: No enlarged lymph nodes, thyromegally or other masses.  Lungs: Good air movement and otherwise clear.  CV: Heart RRR with no murmurs. No JVD, carotid bruits or leg edema.  Abd: Soft, non tender, non distended, with normal bowel sounds. No liver or spleen enlargement. No masses. No hernias.  Rectal: The anal area is normal by inspection. No external hemorrhoids. No bleeding. Refuses rectal digital exam.    Assess:      ICD-10-CM    1. Rectal pain K62.89 hydrocortisone (ANUSOL-HC) 25 MG Suppository     COLORECTAL SURGERY REFERRAL     Plan: Referral given to see colorectal.    Vee Abdalla PA-C

## 2020-09-30 ENCOUNTER — OFFICE VISIT (OUTPATIENT)
Dept: UROLOGY | Facility: CLINIC | Age: 35
End: 2020-09-30
Payer: COMMERCIAL

## 2020-09-30 DIAGNOSIS — Z30.09 ENCOUNTER FOR VASECTOMY COUNSELING: Primary | ICD-10-CM

## 2020-09-30 PROCEDURE — 99203 OFFICE O/P NEW LOW 30 MIN: CPT | Performed by: UROLOGY

## 2020-09-30 NOTE — NURSING NOTE
Kenn Dobson's goals for this visit include:   Chief Complaint   Patient presents with     New Patient     vasectomy consult     He requests these members of his care team be copied on today's visit information: no    PCP: Patricia Lira    Referring Provider:  No referring provider defined for this encounter.    There were no vitals taken for this visit.    Do you need any medication refills at today's visit? Macarena Hudson MA

## 2020-09-30 NOTE — PROGRESS NOTES
CC: Desires sterilization, consult for vasectomy.  New patient, self referred..    HPI: Kenn Dobson is a 35 year old male with 4 children, and he is intersted in getting a vasectomy for sterilization.      No voiding problems.  No history of  trauma.  No hematuria  Normal sexual function.  No history of bleeding or clotting disorders in him or family to his knowledge.    History of heavier alcohol use- history of pancreatitis about 5 years ago.  Occasionally has urinary hestitancy- other times no issues.  Discussed observation, consider alpha-blocker or pelvic floor physical therapy if worsening. Office cystoscopy and UDS if symptoms really worsening with time.    PMH:   Past Medical History:   Diagnosis Date     Anxiety      Bipolar affective (H)      Emotional disorder      Schizoaffective disorder (H)        FAMILY HX:   Family History   Problem Relation Age of Onset     Liver Cancer Maternal Grandfather       No history of coagulopathies, no  malignancies.     ALLERGIES:      Allergies   Allergen Reactions     Dogs        MEDS:   Current Outpatient Medications   Medication Sig     fluticasone (FLONASE) 50 MCG/ACT nasal spray Spray 1-2 sprays into both nostrils daily     multivitamin, therapeutic with minerals (MULTI-VITAMIN) TABS tablet Take 1 tablet by mouth daily     cetirizine (ZYRTEC) 10 MG tablet Take 1 tablet (10 mg) by mouth every evening (Patient not taking: Reported on 9/30/2020)     hydrocortisone (ANUSOL-HC) 25 MG Suppository Place 1 suppository (25 mg) rectally 2 times daily (Patient not taking: Reported on 9/30/2020)     hydrocortisone (ANUSOL-HC) 25 MG Suppository Place 1 suppository (25 mg) rectally 2 times daily as needed for hemorrhoids (Patient not taking: Reported on 9/30/2020)     UNABLE TO FIND MEDICATION NAME: Tumeric     VITAMIN D, CHOLECALCIFEROL, PO Take 1,000 Units by mouth daily     No current facility-administered medications for this visit.        SOCIAL HISTORY:  Social  History     Tobacco Use     Smoking status: Former Smoker     Smokeless tobacco: Current User   Substance Use Topics     Alcohol use: Yes     Comment: Drinks daily     Drug use: No        REVIEW OF SYMPTOMS:   Denies testicular pain, ED, ejaculatory problems, rashes in the groin, easy bruising or bleeding.   No constitutional, eye, ENT, heart, lung, GI, musculoskeletal, skin, neurologic, psychiatric, endocrine or hematologic complaints.       GENERAL PHYSICAL EXAM:   Constitutional: No acute distress. Well nourished.   PSYCH: Normal mood and affect.   NEURO: Normal gait, no focal deficits.   EYES: Anicteric, EOMI, PERR  CARDIOPULMONARY: Breathing non-labored, pulse regular, no peripheral edema.   GI: Abdomen soft, non-tender, surgical scars: none noted,  no organomegaly.  MUSCULOSKELETAL: Normal limb proportions, no muscle wasting, no contractures.    SKIN: Normal virilized hair distribution, no lesions, warts or rashes over genitalia, abdomen extremities or face.   HEME/LYMPH: No echymosis, no lymphadenopathy in groin, no lymphedema.     EXAM:  Phallus un circumcised, meatus adequate, no plaques palpated.   Testes descended, consistency is normal. No intra-testicular masses.  Epididymes present.  Vas present bilateraly, both very easy to find.  PATRICK deferred.        I discussed with him at length the risks and benefits of the procedure. He understands that this is a sterilization procedure, and not reversible contraception. He understands that reversals, while possible, are not guaranteed to work and fairly complex. I discussed with him the option of sperm cryopreservation.     I stressed that he continues to be fertile in the post-operative period, and that he should continue using other contraceptive methods, such as a condom, until he obtains a semen analysis and we review the results to confirm success of the procedure and infertility. I also stressed to him that recanalization and pregnancy can occur in about  1 per thousand cases, possibly more even after we clear him with a semen analysis showing no motile sperm. I counseled him on the loly-operative risks of bleeding, infection, pain.  I described to him post-vasectomy pain syndrome that can occur in about 1 to 2% of men undergoing vasectomy.     We also discussed recovery times (typically days if no complications) and post-operative care including use of ice packs, pain medication and wound care.    He will think about the above and schedule the procedure if he decides to proceed.  We discussed circumcision option.  He doesn't want this right now.  Rarely has balanitis.    Ash LIZ

## 2021-03-04 ENCOUNTER — ANCILLARY PROCEDURE (OUTPATIENT)
Dept: GENERAL RADIOLOGY | Facility: CLINIC | Age: 36
End: 2021-03-04
Attending: FAMILY MEDICINE
Payer: COMMERCIAL

## 2021-03-04 ENCOUNTER — OFFICE VISIT (OUTPATIENT)
Dept: ORTHOPEDICS | Facility: CLINIC | Age: 36
End: 2021-03-04
Payer: COMMERCIAL

## 2021-03-04 DIAGNOSIS — G89.29 CHRONIC RIGHT SHOULDER PAIN: ICD-10-CM

## 2021-03-04 DIAGNOSIS — M25.511 CHRONIC RIGHT SHOULDER PAIN: Primary | ICD-10-CM

## 2021-03-04 DIAGNOSIS — G89.29 CHRONIC RIGHT SHOULDER PAIN: Primary | ICD-10-CM

## 2021-03-04 DIAGNOSIS — M25.511 CHRONIC RIGHT SHOULDER PAIN: ICD-10-CM

## 2021-03-04 PROCEDURE — 99203 OFFICE O/P NEW LOW 30 MIN: CPT | Performed by: FAMILY MEDICINE

## 2021-03-04 PROCEDURE — 73030 X-RAY EXAM OF SHOULDER: CPT | Mod: RT | Performed by: RADIOLOGY

## 2021-03-04 NOTE — PATIENT INSTRUCTIONS
Thanks for coming today.  Ortho[facility] Medicine Clinic  [address] [place] [address]    To schedule future appointments in Ortho Clinic, you may call [phone].    To schedule ordered imaging by your Provider: Call [facility] at [phone]    [employer] available online at:   [email or web address]    Please call if any further questions or concerns [phone] and ask for the Orthopedic Department. Clinic hours 8 am to 5 pm.    Return to clinic if symptoms worsen.

## 2021-03-04 NOTE — LETTER
3/4/2021         RE: Kenn Dobson  [address] [facility] [address]        Dear Colleague,    Thank you for referring your patient, Kenn Dobson, to the [facility] [place]. Please see a copy of my visit note below.          [facility]  3/4/2021    Kenn Dobson's chief complaint for this visit includes:  Chief Complaint   Patient presents with     Consult     right shoulder pain and injury while wrestling, DOI 12/5/20, chiropractic care      PCP: Patricia Lira    Reason for visit:     What part of your body is injured / painful?  right shoulder    What caused the injury /pain? Wrestling     How long ago did your injury occur or pain begin? about a month ago    What are your most bothersome symptoms? Pain    How would you characterize your symptom?  aching    What makes your symptoms better? Rest    What makes your symptoms worse? Movement    Have you been previously seen for this problem? No    Medical History:    Any recent changes to your medical history? No    Any new medication prescribed since last visit? No    Have you had surgery on this body part before? No    Social History:    Occupation: Self employed     Handedness: Right    Review of Systems:    Do you have fever, chills, weight loss? No    Do you have any vision problems? No    Do you have any chest pain or edema? No    Do you have any shortness of breath or wheezing?  No    Do you have stomach problems? No    Do you have any urinary track issues? No    Do you have any numbness or focal weakness? No    Do you have diabetes? No    Do you have problems with bleeding or clotting? No    Do you have an rashes or other skin lesions? No       CHIEF COMPLAINT:  Consult (right shoulder pain and injury while wrestling, DOI 12/5/20, chiropractic care )       HISTORY OF PRESENT ILLNESS  Mr. Dobson is a pleasant 35 year old male who presents to clinic today with right shoulder pain.  Kenn  suffered a right shoulder injury during a wrestling line on December 5.  He felt an immediate pain inside of his shoulder during a match, after having his arm pulled back awkwardly.  He points to the area immediately under his AC joint.  He feels as if his shoulder is unstable.  He denies any numbness or tingling.  He has tried extensive rehab and oral analgesics, he has not necessarily been able to rest from sport as he has continued to .      Additional history: as documented    MEDICAL HISTORY  Patient Active Problem List   Diagnosis     Cauliflower right ear     ED (erectile dysfunction)     Internal hemorrhoid     Multiple joint pain       Current Outpatient Medications   Medication Sig Dispense Refill     cetirizine (ZYRTEC) 10 MG tablet Take 1 tablet (10 mg) by mouth every evening (Patient not taking: Reported on 9/30/2020) 30 tablet 3     fluticasone (FLONASE) 50 MCG/ACT nasal spray Spray 1-2 sprays into both nostrils daily 1 Package 11     hydrocortisone (ANUSOL-HC) 25 MG Suppository Place 1 suppository (25 mg) rectally 2 times daily (Patient not taking: Reported on 9/30/2020) 24 suppository 1     hydrocortisone (ANUSOL-HC) 25 MG Suppository Place 1 suppository (25 mg) rectally 2 times daily as needed for hemorrhoids (Patient not taking: Reported on 9/30/2020) 20 suppository 0     multivitamin, therapeutic with minerals (MULTI-VITAMIN) TABS tablet Take 1 tablet by mouth daily       UNABLE TO FIND MEDICATION NAME: Tumeric       VITAMIN D, CHOLECALCIFEROL, PO Take 1,000 Units by mouth daily         Allergies   Allergen Reactions     Dogs        Family History   Problem Relation Age of Onset     Liver Cancer Maternal Grandfather        Additional medical/Social/Surgical histories reviewed in [facility] and updated as appropriate.        PHYSICAL EXAM  General  - normal appearance, in no obvious distress  HEENT  - conjunctivae not injected, moist mucous membranes  CV  - normal radial pulse  Pulm  - normal  respiratory pattern, non-labored  Musculoskeletal - right shoulder  - inspection: normal bone and joint alignment, no obvious deformity, no scapular winging, no AC step-off  - palpation: mildly tender biceps origin  - ROM: painful and limited flexion at end range   - strength: 5/5  strength, 5/5 in all shoulder planes  - special tests:  (-) Speed's  (+) Neer  (+) Hawkin's  (-) Susi's  (+) [place]'s  (-) subscap lift-off  Neuro  - no sensory or motor deficit, grossly normal coordination, normal muscle tone  Skin  - no ecchymosis, erythema, warmth, or induration, no obvious rash  Psych  - interactive, appropriate, normal mood and affect                 ASSESSMENT & PLAN  Mr. Dobson is a 35 year old male who presents to clinic today with right shoulder pain.    I ordered and reviewed an x-ray of his right shoulder which shows no obvious acute issues.  He does have what appears to be a possible Bankart lesion, the acuity is difficult to tell.    I do think an MR would be reasonable given his failure of conservative care thus far, which includes oral analgesics and rehab.  I will get in touch with him with his MRI results.    It was a pleasure seeing Kenn today.    Kevin Ospina DO, [facility]  Primary Care Sports Medicine      This note was constructed using Dragon dictation software, please excuse any minor errors in spelling, grammar, or syntax.        Again, thank you for allowing me to participate in the care of your patient.        Sincerely,        Kevin Ospina DO

## 2021-03-04 NOTE — PROGRESS NOTES
[facility]  3/4/2021    Kenn Dobson's chief complaint for this visit includes:  Chief Complaint   Patient presents with     Consult     right shoulder pain and injury while wrestling, DOI 12/5/20, chiropractic care      PCP: Patricia Lira    Reason for visit:     What part of your body is injured / painful?  right shoulder    What caused the injury /pain? Wrestling     How long ago did your injury occur or pain begin? about a month ago    What are your most bothersome symptoms? Pain    How would you characterize your symptom?  aching    What makes your symptoms better? Rest    What makes your symptoms worse? Movement    Have you been previously seen for this problem? No    Medical History:    Any recent changes to your medical history? No    Any new medication prescribed since last visit? No    Have you had surgery on this body part before? No    Social History:    Occupation: Self employed     Handedness: Right    Review of Systems:    Do you have fever, chills, weight loss? No    Do you have any vision problems? No    Do you have any chest pain or edema? No    Do you have any shortness of breath or wheezing?  No    Do you have stomach problems? No    Do you have any urinary track issues? No    Do you have any numbness or focal weakness? No    Do you have diabetes? No    Do you have problems with bleeding or clotting? No    Do you have an rashes or other skin lesions? No       CHIEF COMPLAINT:  Consult (right shoulder pain and injury while wrestling, DOI 12/5/20, chiropractic care )       HISTORY OF PRESENT ILLNESS  Mr. Dobson is a pleasant 35 year old male who presents to clinic today with right shoulder pain.  Kenn suffered a right shoulder injury during a wrestling line on December 5.  He felt an immediate pain inside of his shoulder during a match, after having his arm pulled back awkwardly.  He points to the area immediately under his AC joint.  He feels as if his shoulder is  unstable.  He denies any numbness or tingling.  He has tried extensive rehab and oral analgesics, he has not necessarily been able to rest from sport as he has continued to .      Additional history: as documented    MEDICAL HISTORY  Patient Active Problem List   Diagnosis     Cauliflower right ear     ED (erectile dysfunction)     Internal hemorrhoid     Multiple joint pain       Current Outpatient Medications   Medication Sig Dispense Refill     cetirizine (ZYRTEC) 10 MG tablet Take 1 tablet (10 mg) by mouth every evening (Patient not taking: Reported on 9/30/2020) 30 tablet 3     fluticasone (FLONASE) 50 MCG/ACT nasal spray Spray 1-2 sprays into both nostrils daily 1 Package 11     hydrocortisone (ANUSOL-HC) 25 MG Suppository Place 1 suppository (25 mg) rectally 2 times daily (Patient not taking: Reported on 9/30/2020) 24 suppository 1     hydrocortisone (ANUSOL-HC) 25 MG Suppository Place 1 suppository (25 mg) rectally 2 times daily as needed for hemorrhoids (Patient not taking: Reported on 9/30/2020) 20 suppository 0     multivitamin, therapeutic with minerals (MULTI-VITAMIN) TABS tablet Take 1 tablet by mouth daily       UNABLE TO FIND MEDICATION NAME: Tumeric       VITAMIN D, CHOLECALCIFEROL, PO Take 1,000 Units by mouth daily         Allergies   Allergen Reactions     Dogs        Family History   Problem Relation Age of Onset     Liver Cancer Maternal Grandfather        Additional medical/Social/Surgical histories reviewed in [facility] and updated as appropriate.        PHYSICAL EXAM  General  - normal appearance, in no obvious distress  HEENT  - conjunctivae not injected, moist mucous membranes  CV  - normal radial pulse  Pulm  - normal respiratory pattern, non-labored  Musculoskeletal - right shoulder  - inspection: normal bone and joint alignment, no obvious deformity, no scapular winging, no AC step-off  - palpation: mildly tender biceps origin  - ROM: painful and limited flexion at end range   -  strength: 5/5  strength, 5/5 in all shoulder planes  - special tests:  (-) Speed's  (+) Neer  (+) Hawkin's  (-) Susi's  (+) [place]'s  (-) subscap lift-off  Neuro  - no sensory or motor deficit, grossly normal coordination, normal muscle tone  Skin  - no ecchymosis, erythema, warmth, or induration, no obvious rash  Psych  - interactive, appropriate, normal mood and affect                 ASSESSMENT & PLAN  Mr. Dobson is a 35 year old male who presents to clinic today with right shoulder pain.    I ordered and reviewed an x-ray of his right shoulder which shows no obvious acute issues.  He does have what appears to be a possible Bankart lesion, the acuity is difficult to tell.    I do think an MR would be reasonable given his failure of conservative care thus far, which includes oral analgesics and rehab.  I will get in touch with him with his MRI results.    It was a pleasure seeing Kenn today.    Kevin Ospina DO, [facility]  Primary Care Sports Medicine      This note was constructed using Dragon dictation software, please excuse any minor errors in spelling, grammar, or syntax.

## 2021-03-19 ENCOUNTER — MEDICAL CORRESPONDENCE (OUTPATIENT)
Dept: HEALTH INFORMATION MANAGEMENT | Facility: CLINIC | Age: 36
End: 2021-03-19

## 2021-03-22 ENCOUNTER — TELEPHONE (OUTPATIENT)
Dept: ORTHOPEDICS | Facility: CLINIC | Age: 36
End: 2021-03-22

## 2021-03-22 NOTE — TELEPHONE ENCOUNTER
Patient was contacted today and a voicemail was left.     The caller was calling to discuss his MRI and follow up appointment for tomorrow. Since the patient did not complete his MRI, the caller is wondering if he would like to reschedule and schedule the MRI at a different location.

## 2022-05-19 ENCOUNTER — OFFICE VISIT (OUTPATIENT)
Dept: ORTHOPEDICS | Facility: CLINIC | Age: 37
End: 2022-05-19
Payer: COMMERCIAL

## 2022-05-19 VITALS
DIASTOLIC BLOOD PRESSURE: 62 MMHG | BODY MASS INDEX: 29.03 KG/M2 | WEIGHT: 185 LBS | SYSTOLIC BLOOD PRESSURE: 105 MMHG | HEIGHT: 67 IN

## 2022-05-19 DIAGNOSIS — M17.11 ARTHRITIS OF RIGHT KNEE: ICD-10-CM

## 2022-05-19 DIAGNOSIS — M25.561 RIGHT KNEE PAIN, UNSPECIFIED CHRONICITY: Primary | ICD-10-CM

## 2022-05-19 DIAGNOSIS — M25.461 EFFUSION OF RIGHT KNEE: ICD-10-CM

## 2022-05-19 PROCEDURE — 99204 OFFICE O/P NEW MOD 45 MIN: CPT | Performed by: PEDIATRICS

## 2022-05-19 NOTE — PROGRESS NOTES
"ASSESSMENT & PLAN    Kenn was seen today for pain.    Diagnoses and all orders for this visit:    Right knee pain, unspecified chronicity  -     XR Knee Standing AP Bilat [facility] Bilat Lat Right; Future  -     MR Knee Right w/o Contrast; Future    Effusion of right knee    Arthritis of right knee      This issue is acute on chronic and Unchanged.    We discussed these other possible diagnosis: Likely flare of degenerative changes, does have an effusion. Will obtain MRI given acute onset pain.    Plan:  - Today's Plan of Care:  MRI of the Right Knee- Call [phone] to schedule MRI  Continue with relative rest and activity modification, Ice, Compression, and Elevation.  Can apply ice 10-15 minutes 3-4 times per day as needed. OTC medications as needed.  Home Exercise Program - range of motion, quad sets, straight leg raises    -We also discussed other future treatment options:  Referral to Orthopedic Surgery  Consideration of injections    Follow Up: In clinic with Dr. Hunter after MRI (wait at least 1-2 days)    Concerning signs and symptoms were reviewed.  The patient expressed understanding of this management plan and all questions were answered at this time.    Lula Hunter MD [facility]  Sports Medicine Physician  [facility]      -----  Chief Complaint   Patient presents with     Right Knee - Pain       SUBJECTIVE  Kenn Dobson is a/an 36 year old male who is seen as a self referral for evaluation of bilateral knee pain.     The patient is seen by themselves.    Onset: 4-5 month(s) ago. Patient describes injury as playing basketball frequently at the time, cannot remember an incident, does remember that \"ouch, his knee hurt.\"  After that, started wrestling again, which did not help.  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    Location of Pain: right knee; feels deep, cannot touch it  Worsened by: driving (moving the foot from gas to brake), uncrossing his legs   Better with: " "nothing at this time  Treatments tried: rest/activity avoidance, elevation, ice, previous imaging (xray 5 years ago, MRI 3 years ago) and corticosteroid injection (most recent date: 5 years ago)   Associated symptoms: swelling, weakness of right knee, feeling of instability and sharp/random pains    Orthopedic/Surgical history: YES - Date: has had scope of the right knee  (within the last 10 years)  Social History/Occupation: self employed, is competitively wrestling     No family history pertinent to patient's problem today.    REVIEW OF SYSTEMS:  Review of Systems  Skin: no bruising, yes swelling  Musculoskeletal: as above  Neurologic: no numbness, paresthesias  Remainder of review of systems is negative including constitutional, CV, pulmonary, GI, except as noted in HPI or medical history.    OBJECTIVE:  /62   Ht 1.702 m (5' 7\")   Wt 83.9 kg (185 lb)   BMI 28.98 kg/m     General: healthy, alert and in no distress  HEENT: no scleral icterus or conjunctival erythema  Skin: no suspicious lesions or rash. No jaundice.  CV: distal perfusion intact  Resp: normal respiratory effort without conversational dyspnea   Psych: normal mood and affect  Gait: normal steady gait with appropriate coordination and balance  Neuro: Normal light sensory exam of lower extremity    Bilateral Knee exam  Inspection:      Mild - moderate effusion right    Patella:      Crepitus noted in the patellofemoral joint bilateral    Tender:      medial joint line right       lateral joint line right    Non Tender:      remainder of knee area right    Knee ROM:      Range of motion limited slightly on the right with flexion and extension    Hip ROM:     Full ROM bilateral hips without pain    Strength:      5/5 with knee extension bilateral    Special Tests:     Mild pain Chavez right       neg (-) anterior drawer right       neg (-) posterior drawer right       neg (-) varus at 0 deg and 30 deg right       neg (-) valgus at 0 deg and 30 " deg right    Gait:      normal    Neurovascular:      2+ peripheral pulses bilaterally and brisk capillary refill       sensation grossly intact    RADIOLOGY:  I independently ordered, visualized and reviewed these images with the patient  AP and sunrise bilateral and right lateral XR views of knees reviewed: no acute bony abnormality, moderate medial compartment degenerative change on the right  - will follow official read      Reviewed prior MR right knee without contrast 2/6/2018:   - medial meniscal tearing, early possible insufficiency fracture, mild effusion    Review of the result(s) of each unique test - Xr and MRI

## 2022-05-19 NOTE — LETTER
"    5/19/2022         RE: Kenn Dobson  [address]  [facility] [address]        Dear Colleague,    Thank you for referring your patient, Kenn Dobson, to the [facility] [place]. Please see a copy of my visit note below.    ASSESSMENT & PLAN    Kenn was seen today for pain.    Diagnoses and all orders for this visit:    Right knee pain, unspecified chronicity  -     XR Knee Standing AP Bilat [place] Bilat Lat Right; Future  -     MR Knee Right w/o Contrast; Future    Effusion of right knee    Arthritis of right knee      This issue is acute on chronic and Unchanged.    We discussed these other possible diagnosis: Likely flare of degenerative changes, does have an effusion. Will obtain MRI given acute onset pain.    Plan:  - Today's Plan of Care:  MRI of the Right Knee- Call [phone] to schedule MRI  Continue with relative rest and activity modification, Ice, Compression, and Elevation.  Can apply ice 10-15 minutes 3-4 times per day as needed. OTC medications as needed.  Home Exercise Program - range of motion, quad sets, straight leg raises    -We also discussed other future treatment options:  Referral to Orthopedic Surgery  Consideration of injections    Follow Up: In clinic with Dr. Hunter after MRI (wait at least 1-2 days)    Concerning signs and symptoms were reviewed.  The patient expressed understanding of this management plan and all questions were answered at this time.    Lula Hunter MD [facility]  Sports Medicine Physician  [facility]      -----  Chief Complaint   Patient presents with     Right Knee - Pain       SUBJECTIVE  Kenn Dobson is a/an 36 year old male who is seen as a self referral for evaluation of bilateral knee pain.     The patient is seen by themselves.    Onset: 4-5 month(s) ago. Patient describes injury as playing basketball frequently at the time, cannot remember an incident, does remember that \"ouch, his knee " "hurt.\"  After that, started wrestling again, which did not help.  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    Location of Pain: right knee; feels deep, cannot touch it  Worsened by: driving (moving the foot from gas to brake), uncrossing his legs   Better with: nothing at this time  Treatments tried: rest/activity avoidance, elevation, ice, previous imaging (xray 5 years ago, MRI 3 years ago) and corticosteroid injection (most recent date: 5 years ago)   Associated symptoms: swelling, weakness of right knee, feeling of instability and sharp/random pains    Orthopedic/Surgical history: YES - Date: has had scope of the right knee  (within the last 10 years)  Social History/Occupation: self employed, is competitively wrestling     No family history pertinent to patient's problem today.    REVIEW OF SYSTEMS:  Review of Systems  Skin: no bruising, yes swelling  Musculoskeletal: as above  Neurologic: no numbness, paresthesias  Remainder of review of systems is negative including constitutional, CV, pulmonary, GI, except as noted in HPI or medical history.    OBJECTIVE:  /62   Ht 1.702 m (5' 7\")   Wt 83.9 kg (185 lb)   BMI 28.98 kg/m     General: healthy, alert and in no distress  HEENT: no scleral icterus or conjunctival erythema  Skin: no suspicious lesions or rash. No jaundice.  CV: distal perfusion intact  Resp: normal respiratory effort without conversational dyspnea   Psych: normal mood and affect  Gait: normal steady gait with appropriate coordination and balance  Neuro: Normal light sensory exam of lower extremity    Bilateral Knee exam  Inspection:      Mild - moderate effusion right    Patella:      Crepitus noted in the patellofemoral joint bilateral    Tender:      medial joint line right       lateral joint line right    Non Tender:      remainder of knee area right    Knee ROM:      Range of motion limited slightly on the right with flexion and extension    Hip ROM:     Full ROM bilateral hips " without pain    Strength:      5/5 with knee extension bilateral    Special Tests:     Mild pain Chavez right       neg (-) anterior drawer right       neg (-) posterior drawer right       neg (-) varus at 0 deg and 30 deg right       neg (-) valgus at 0 deg and 30 deg right    Gait:      normal    Neurovascular:      2+ peripheral pulses bilaterally and brisk capillary refill       sensation grossly intact    RADIOLOGY:  I independently ordered, visualized and reviewed these images with the patient  AP and sunrise bilateral and right lateral XR views of knees reviewed: no acute bony abnormality, moderate medial compartment degenerative change on the right  - will follow official read      Reviewed prior MR right knee without contrast 2/6/2018:   - medial meniscal tearing, early possible insufficiency fracture, mild effusion    Review of the result(s) of each unique test - Xr and MRI             Again, thank you for allowing me to participate in the care of your patient.        Sincerely,        Lula Hunter MD

## 2022-05-19 NOTE — PATIENT INSTRUCTIONS
We discussed these other possible diagnosis: Likely flare of degenerative changes, does have an effusion. Will obtain MRI given acute onset pain.    Plan:  - Today's Plan of Care:  MRI of the Right Knee- Call [phone] to schedule MRI  Continue with relative rest and activity modification, Ice, Compression, and Elevation.  Can apply ice 10-15 minutes 3-4 times per day as needed. OTC medications as needed.  Home Exercise Program - range of motion, quad sets, straight leg raises    -We also discussed other future treatment options:  Referral to Orthopedic Surgery  Consideration of injections    Follow Up: In clinic with Dr. Hunter after MRI (wait at least 1-2 days)    If you have any further questions for your physician or physician s care team you can call [phone] and use option 3 to leave a voice message. Calls received during business hours will be returned same day.

## 2023-11-15 ENCOUNTER — APPOINTMENT (OUTPATIENT)
Dept: ULTRASOUND IMAGING | Facility: CLINIC | Age: 38
End: 2023-11-15
Attending: EMERGENCY MEDICINE
Payer: COMMERCIAL

## 2023-11-15 ENCOUNTER — HOSPITAL ENCOUNTER (EMERGENCY)
Facility: CLINIC | Age: 38
Discharge: HOME | End: 2023-11-15
Attending: EMERGENCY MEDICINE | Admitting: EMERGENCY MEDICINE
Payer: COMMERCIAL

## 2023-11-15 VITALS
HEART RATE: 68 BPM | RESPIRATION RATE: 18 BRPM | TEMPERATURE: 98.5 F | BODY MASS INDEX: 26.78 KG/M2 | DIASTOLIC BLOOD PRESSURE: 79 MMHG | OXYGEN SATURATION: 98 % | SYSTOLIC BLOOD PRESSURE: 117 MMHG | WEIGHT: 171 LBS

## 2023-11-15 DIAGNOSIS — R10.11 RIGHT UPPER QUADRANT ABDOMINAL PAIN: ICD-10-CM

## 2023-11-15 LAB
ALBUMIN SERPL BCG-MCNC: 4.7 G/DL (ref 3.5–5.2)
ALP SERPL-CCNC: 51 U/L (ref 40–150)
ALT SERPL W P-5'-P-CCNC: 16 U/L (ref 0–70)
ANION GAP SERPL CALCULATED.3IONS-SCNC: 12 MMOL/L (ref 7–15)
AST SERPL W P-5'-P-CCNC: 18 U/L (ref 0–45)
BASOPHILS # BLD AUTO: 0.1 10E3/UL (ref 0–0.2)
BASOPHILS NFR BLD AUTO: 1 %
BILIRUB SERPL-MCNC: 0.5 MG/DL
BUN SERPL-MCNC: 20 MG/DL (ref 6–20)
CALCIUM SERPL-MCNC: 9.7 MG/DL (ref 8.6–10)
CHLORIDE SERPL-SCNC: 105 MMOL/L (ref 98–107)
CREAT SERPL-MCNC: 1.19 MG/DL (ref 0.67–1.17)
DEPRECATED HCO3 PLAS-SCNC: 25 MMOL/L (ref 22–29)
EGFRCR SERPLBLD CKD-EPI 2021: 81 ML/MIN/1.73M2
EOSINOPHIL # BLD AUTO: 0 10E3/UL (ref 0–0.7)
EOSINOPHIL NFR BLD AUTO: 1 %
ERYTHROCYTE [DISTWIDTH] IN BLOOD BY AUTOMATED COUNT: 12.7 % (ref 10–15)
GLUCOSE SERPL-MCNC: 101 MG/DL (ref 70–99)
HCT VFR BLD AUTO: 47.2 % (ref 40–53)
HGB BLD-MCNC: 16.3 G/DL (ref 13.3–17.7)
IMM GRANULOCYTES # BLD: 0 10E3/UL
IMM GRANULOCYTES NFR BLD: 1 %
LIPASE SERPL-CCNC: 22 U/L (ref 13–60)
LYMPHOCYTES # BLD AUTO: 1.5 10E3/UL (ref 0.8–5.3)
LYMPHOCYTES NFR BLD AUTO: 23 %
MCH RBC QN AUTO: 27.7 PG (ref 26.5–33)
MCHC RBC AUTO-ENTMCNC: 34.5 G/DL (ref 31.5–36.5)
MCV RBC AUTO: 80 FL (ref 78–100)
MONOCYTES # BLD AUTO: 0.5 10E3/UL (ref 0–1.3)
MONOCYTES NFR BLD AUTO: 9 %
NEUTROPHILS # BLD AUTO: 4.1 10E3/UL (ref 1.6–8.3)
NEUTROPHILS NFR BLD AUTO: 65 %
NRBC # BLD AUTO: 0 10E3/UL
NRBC BLD AUTO-RTO: 0 /100
PLATELET # BLD AUTO: 229 10E3/UL (ref 150–450)
POTASSIUM SERPL-SCNC: 4.1 MMOL/L (ref 3.4–5.3)
PROT SERPL-MCNC: 7.8 G/DL (ref 6.4–8.3)
RBC # BLD AUTO: 5.89 10E6/UL (ref 4.4–5.9)
SODIUM SERPL-SCNC: 142 MMOL/L (ref 135–145)
WBC # BLD AUTO: 6.3 10E3/UL (ref 4–11)

## 2023-11-15 PROCEDURE — 85025 COMPLETE CBC W/AUTO DIFF WBC: CPT | Performed by: EMERGENCY MEDICINE

## 2023-11-15 PROCEDURE — 99284 EMERGENCY DEPT VISIT MOD MDM: CPT | Performed by: EMERGENCY MEDICINE

## 2023-11-15 PROCEDURE — 99284 EMERGENCY DEPT VISIT MOD MDM: CPT | Mod: 25 | Performed by: EMERGENCY MEDICINE

## 2023-11-15 PROCEDURE — 76705 ECHO EXAM OF ABDOMEN: CPT

## 2023-11-15 PROCEDURE — 83690 ASSAY OF LIPASE: CPT | Performed by: EMERGENCY MEDICINE

## 2023-11-15 PROCEDURE — 258N000003 HC RX IP 258 OP 636: Performed by: EMERGENCY MEDICINE

## 2023-11-15 PROCEDURE — 36415 COLL VENOUS BLD VENIPUNCTURE: CPT | Performed by: EMERGENCY MEDICINE

## 2023-11-15 PROCEDURE — 96360 HYDRATION IV INFUSION INIT: CPT | Mod: 59 | Performed by: EMERGENCY MEDICINE

## 2023-11-15 PROCEDURE — 80053 COMPREHEN METABOLIC PANEL: CPT | Performed by: EMERGENCY MEDICINE

## 2023-11-15 RX ADMIN — SODIUM CHLORIDE, POTASSIUM CHLORIDE, SODIUM LACTATE AND CALCIUM CHLORIDE 1000 ML: 600; 310; 30; 20 INJECTION, SOLUTION INTRAVENOUS at 14:22

## 2023-11-15 ASSESSMENT — ACTIVITIES OF DAILY LIVING (ADL): ADLS_ACUITY_SCORE: 35

## 2023-11-15 NOTE — ED TRIAGE NOTES
"PT comes in w/ RUQ pain that started 3 days ago. Had a \"pancreatic attack about 5 years ago\".         "

## 2023-11-15 NOTE — MEDICATION SCRIBE - ADMISSION MEDICATION HISTORY
Medication Scribe Admission Medication History    Admission medication history is complete. The information provided in this note is only as accurate as the sources available at the time of the update.    Information Source(s): Patient via in-person    Pertinent Information: Patient reports he does not take any medications including all OTC and supplement.     Changes made to PTA medication list:  Added: None  Deleted: Cetirizine 10 mg - not taking   Fluticasone 50 mcg nasal spray - not taking   Changed: None    Medication Affordability:  Not including over the counter (OTC) medications, was there a time in the past 3 months when you did not take your medications as prescribed because of cost?: No    Allergies reviewed with patient and updates made in EHR: yes    Medication History Completed By: SARABJIT MITCHELL 11/15/2023 3:23 PM    No outpatient medications have been marked as taking for the 11/15/23 encounter (Hospital Encounter).

## 2023-11-15 NOTE — DISCHARGE INSTRUCTIONS
All of your blood work today looked normal, including your lipase, which is a pancreatic enzyme, as well as your liver tests or anything that would indicate a problem with your gallbladder.  Your ultrasound looked normal    Follow up with your primary provider to discuss HIDA scan or further evaluation of your gallbladder    If you develop any significant new or worsening symptoms do not hesitate to return to the emergency room for evaluation

## 2023-11-15 NOTE — ED PROVIDER NOTES
History     Chief Complaint   Patient presents with    Abdominal Pain     HPI  Kenn Dobson is a 38 year old male who presents for concern of abdominal pain.  Has pain in his right upper abdomen.  Symptoms started few days ago.  Feels very similar to an episode of pancreatitis he had several years ago.  Nothing seems to trigger this current episode.  He said that his previous pancreatitis episode was triggered by drinking too much alcohol, and he has been abstaining from alcohol since that time.  He also is avoiding taking any ibuprofen as this seems to exacerbate his abdominal pain and pancreatitis.  With current abdominal pain he has not had any vomiting but does have some mild nausea.  Has not been running a fever.  No constipation or diarrhea.  He is not on any medication.    Allergies:  Allergies   Allergen Reactions    Dogs        Problem List:    Patient Active Problem List    Diagnosis Date Noted    Cauliflower right ear 05/30/2014     Priority: Medium    ED (erectile dysfunction) 05/30/2014     Priority: Medium    Internal hemorrhoid 05/30/2014     Priority: Medium    Multiple joint pain 05/30/2014     Priority: Medium        Past Medical History:    Past Medical History:   Diagnosis Date    Anxiety     Bipolar affective (H)     Emotional disorder     Schizoaffective disorder (H)        Past Surgical History:    History reviewed. No pertinent surgical history.    Family History:    Family History   Problem Relation Age of Onset    Liver Cancer Maternal Grandfather        Social History:  Marital Status:   [2]  Social History     Tobacco Use    Smoking status: Former    Smokeless tobacco: Current   Substance Use Topics    Alcohol use: Not Currently     Comment: Drinks daily    Drug use: No        Medications:    No current outpatient medications on file.        Review of Systems   All other systems reviewed and are negative.      Physical Exam   BP: (!) 136/90  Pulse: 68  Temp: 98.5  F (36.9   C)  Resp: 18  Weight: 77.6 kg (171 lb)  SpO2: 98 %      Physical Exam  Vitals and nursing note reviewed.   Constitutional:       General: He is not in acute distress.     Appearance: He is not ill-appearing.   Eyes:      General: No scleral icterus.  Cardiovascular:      Rate and Rhythm: Normal rate and regular rhythm.   Pulmonary:      Effort: Pulmonary effort is normal.      Breath sounds: Normal breath sounds.   Abdominal:      General: Abdomen is flat. Bowel sounds are normal.      Palpations: Abdomen is soft. There is no mass.      Tenderness: There is abdominal tenderness in the right upper quadrant and epigastric area. Positive signs include Llanos's sign.   Neurological:      Mental Status: He is alert.         ED Course                 Procedures              Critical Care time:  none               Results for orders placed or performed during the hospital encounter of 11/15/23 (from the past 24 hour(s))   CBC with platelets differential    Narrative    The following orders were created for panel order CBC with platelets differential.  Procedure                               Abnormality         Status                     ---------                               -----------         ------                     CBC with platelets and d...[[id number]]                      Final result                 Please view results for these tests on the individual orders.   Comprehensive metabolic panel   Result Value Ref Range    Sodium 142 135 - 145 mmol/L    Potassium 4.1 3.4 - 5.3 mmol/L    Carbon Dioxide (CO2) 25 22 - 29 mmol/L    Anion Gap 12 7 - 15 mmol/L    Urea Nitrogen 20.0 6.0 - 20.0 mg/dL    Creatinine 1.19 (H) 0.67 - 1.17 mg/dL    GFR Estimate 81 >60 mL/min/1.73m2    Calcium 9.7 8.6 - 10.0 mg/dL    Chloride 105 98 - 107 mmol/L    Glucose 101 (H) 70 - 99 mg/dL    Alkaline Phosphatase 51 40 - 150 U/L    AST 18 0 - 45 U/L    ALT 16 0 - 70 U/L    Protein Total 7.8 6.4 - 8.3 g/dL    Albumin 4.7 3.5 - 5.2 g/dL     Bilirubin Total 0.5 <=1.2 mg/dL   Lipase   Result Value Ref Range    Lipase 22 13 - 60 U/L   CBC with platelets and differential   Result Value Ref Range    WBC Count 6.3 4.0 - 11.0 10e3/uL    RBC Count 5.89 4.40 - 5.90 10e6/uL    Hemoglobin 16.3 13.3 - 17.7 g/dL    Hematocrit 47.2 40.0 - 53.0 %    MCV 80 78 - 100 fL    MCH 27.7 26.5 - 33.0 pg    MCHC 34.5 31.5 - 36.5 g/dL    RDW 12.7 10.0 - 15.0 %    Platelet Count 229 150 - 450 10e3/uL    % Neutrophils 65 %    % Lymphocytes 23 %    % Monocytes 9 %    % Eosinophils 1 %    % Basophils 1 %    % Immature Granulocytes 1 %    NRBCs per 100 WBC 0 <1 /100    Absolute Neutrophils 4.1 1.6 - 8.3 10e3/uL    Absolute Lymphocytes 1.5 0.8 - 5.3 10e3/uL    Absolute Monocytes 0.5 0.0 - 1.3 10e3/uL    Absolute Eosinophils 0.0 0.0 - 0.7 10e3/uL    Absolute Basophils 0.1 0.0 - 0.2 10e3/uL    Absolute Immature Granulocytes 0.0 <=0.4 10e3/uL    Absolute NRBCs 0.0 10e3/uL   US Abdomen Limited (RUQ)    Narrative    US ABDOMEN LIMITED 11/15/2023 2:59 PM    CLINICAL HISTORY: RUQ abdominal pain, history of pancreatitis, rule  out gallstones.    TECHNIQUE: Limited abdominal ultrasound.    COMPARISON: Abdominal ultrasound 12/20/2017    FINDINGS:    GALLBLADDER: The gallbladder is normal. No gallstones, wall  thickening, or pericholecystic fluid. Negative sonographic Llanos's  sign.    BILE DUCTS: There is no biliary dilatation. The common duct measures 5  mm.    LIVER: Homogeneous parenchyma. No focal mass.    RIGHT KIDNEY: No hydronephrosis.    PANCREAS: The visualized portions of the pancreas are unremarkable.    No ascites.      Impression    IMPRESSION:  1.  No gallstone. No biliary duct dilatation.  2.  Unremarkable liver.    SNOW FLOR MD         SYSTEM ID:  [id number]       Medications   lactated ringers BOLUS 1,000 mL (0 mLs Intravenous Stopped 11/15/23 1528)       Assessments & Plan (with Medical Decision Making)  Kenn is a 38-year-old male presenting for concern of  "right upper quadrant abdominal pain for the last 3 days feeling similar to previous episode of pancreatitis.  See history and physical exam as above  Pleasant 38-year-old male in no acute distress, vitally stable and afebrile.  He does have some tenderness in the epigastric region and more so in the right upper quadrant of his abdomen, with a positive Llanos sign.  Does not have any rigidity.  No palpable mass.  Normal bowel sounds throughout.  Remainder of physical exam is otherwise unremarkable.  We will check blood work and will also plan to get an ultrasound, as I do have concern for gallbladder pathology more so than pancreatitis.  However, could consider CT scan if needed if lab work indicates and ultrasound is nonspecific.  Patient is agreeable to this plan.  He was offered medication to help with his symptoms, but he would like to wait for work-up first.  He is agreeable to IV fluids  Labs and ultrasound as above.  Specifically, LFTs are within normal limits, lipase is within normal, white blood cell count is also normal.  Ultrasound does not reveal any acute cholelithiasis or cholecystitis, no biliary duct dilation.  Portions of pancreas that were visible on ultrasound did not show any acute worrisome findings.  Explained the reassuring work-up to the patient.  He did seem somewhat frustrated, and asks \"what is the resolution then for today\".  Unfortunately, I did step out of the room to take another call.  He said that he did not want to wait any longer and would just like to have discharge paperwork and would like to go.  Did explain that he should follow-up, as we can only rule out emergent pathology here in the emergency room, and that his primary provider can help with additional work-up since he is having persistent abdominal pain.  Should return if worsening symptoms develop.  Discharged in stable condition     I have reviewed the nursing notes.    I have reviewed the findings, diagnosis, plan and " need for follow up with the patient.           Medical Decision Making  The patient's presentation was of low complexity (an acute and uncomplicated illness or injury).    The patient's evaluation involved:  ordering and/or review of 3+ test(s) in this encounter (see separate area of note for details)    The patient's management necessitated only low risk treatment.        There are no discharge medications for this patient.      Final diagnoses:   Right upper quadrant abdominal pain       11/15/2023   [facility] EMERGENCY DEPT       Yennifer Rodriguez DO  11/15/23 2010

## 2024-11-05 ENCOUNTER — VIRTUAL VISIT (OUTPATIENT)
Dept: FAMILY MEDICINE | Facility: CLINIC | Age: 39
End: 2024-11-05
Payer: COMMERCIAL

## 2024-11-05 DIAGNOSIS — M23.203 OLD COMPLEX TEAR OF MEDIAL MENISCUS OF RIGHT KNEE: Primary | ICD-10-CM

## 2024-11-05 PROCEDURE — 99203 OFFICE O/P NEW LOW 30 MIN: CPT | Mod: 95 | Performed by: INTERNAL MEDICINE

## 2024-11-05 NOTE — PROGRESS NOTES
"  If patient has telephone visit, have they been educated on video visit as preferred visit method and offered to change to video visit? NOT APPLICABLE        Instructions Relayed to Patient by Virtual Roomer:     Patient is active on [employer]:   Relayed following to patient: \"It looks like you are active on [employer], are you able to join the visit this way? If not, do you need us to send you a link now or would you like your provider to send a link via text or email when they are ready to initiate the visit?\"      Patient Confirmed they will join visit via: [employer]  Reminded patient to ensure they were logged on to virtual visit by arrival time listed.   Asked if patient has flexibility to initiate visit sooner than arrival time: patient is unable to initiate visit earlier than arrival time     If pediatric virtual visit, ensured pediatric patient along with parent/guardian will be present for video visit.     Patient offered the website [email or web address] and/or phone number to [employer] Help line: [phone]      Answers submitted by the patient for this visit:  General Questionnaire (Submitted on 11/4/2024)  Chief Complaint: Chronic problems general questions HPI Form  What is the reason for your visit today? : Knee surgery referral.  How many servings of fruits and vegetables do you eat daily?: 0-1  On average, how many sweetened beverages do you drink each day (Examples: soda, juice, sweet tea, etc.  Do NOT count diet or artificially sweetened beverages)?: 2  How many minutes a day do you exercise enough to make your heart beat faster?: 30 to 60  How many days a week do you exercise enough to make your heart beat faster?: 5  How many days per week do you miss taking your medication?: 0  Questionnaire about: Chronic problems general questions HPI Form (Submitted on 11/4/2024)  Chief Complaint: Chronic problems general questions HPI Form  Kenn is a 39 year old who is being evaluated via a " billable video visit.    How would you like to obtain your AVS? MyChart  If the video visit is dropped, the invitation should be resent by: Text to cell phone: [phone]  Will anyone else be joining your video visit? No      Assessment & Plan     Kenn was seen today for referral.    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 If he has surgery schedule, will need to schedule for [facility].         Mirlande Hawk is a 39 year old, presenting for the following health issues:  Referral        11/5/2024     6:54 AM   Additional Questions   Roomed by Juvencioia   Accompanied by self     History of Present Illness       Reason for visit:  Knee surgery referral.    He eats 0-1 servings of fruits and vegetables daily.He consumes 2 sweetened beverage(s) daily.He exercises with enough effort to increase his heart rate 30 to 60 minutes per day.  He exercises with enough effort to increase his heart rate 5 days per week.   He is taking medications regularly.     History of complex meniscal tear of the right knee. Had injection in the past. It is getting really bad now. Reported this is beyond injection and PT. He would like to see a surgeon to get surgery done ASAP.    Last right knee MRI was in 2018. Last knee xray 2022.     He doesn't want to address anything else at this time.           Objective           Vitals:  No vitals were obtained today due to virtual visit.    Physical Exam   GENERAL: alert and no distress  EYES: Eyes grossly normal to inspection.  No discharge or erythema, or obvious scleral/conjunctival abnormalities.  RESP: No audible wheeze, cough, or visible cyanosis.    NEURO: Mentation and speech appropriate for age.  PSYCH: Appropriate affect, tone, and pace of words             Final  Kenn Dobson          MRN:  [id number]       :    1985     [facility] [place] Acct:    Pt Class:      [facility] [place] Adm:    [facility]:      [address] [address] [place] [address]  [phone] Phone: [phone] Sex:  Male         IMAGING REPORT      Exam:  XR Knee Standing AP Bilat [place] Bilat Lat Right       ACN:[id number]      Date/Time Completed:   2022 5404                                      Authorizing Provider:  Lula Hunter  Ordering Provider:  Lula Hunter  Attending Provider:     ASTRID Provider:     ______________________________________________________________________________________        KNEE STANDING AP BILATERAL [place] BILATERAL LATERAL RIGHT   2022  4:54 PM      HISTORY: Right knee pain.     COMPARISON: Radiographs from 3/2/2017.                                                                      IMPRESSION:     Right: Moderate osteoarthrosis of the medial compartment, progressed.  Mild patellofemoral osteoarthrosis, progressed. No fracture, effusion  or calcified intra-articular body.     Left: Minimal medial compartment narrowing. The two views that include  the left knee show no other abnormalities.     REBECA BROCK MD         SYSTEM ID:  [id number]     ______________________________________________________________________________________  End of diagnostic Imaging report for accession:  [id number]      ______________________________________________________________________________________       Read By: Rebeca Brock MD  Signed by: Rebeca Brock on 2022  5:14 PM        Final  Dobson Kenn Denise         MRN:  [id number]       :    1985     [facility] Acct:    Pt Class:  Outpatient   [facility] Adm:    [facility]:      [address] [place] [address]  [phone] Phone: [phone] Sex:  Male         IMAGING REPORT       Exam:  MR Knee Right w/o Contrast       ACN:[id number]      Date/Time Completed:   02/06/2018 1012                                      Authorizing Provider:  Patricia Lira  Ordering Provider:  Patricia Lira  Attending Provider:     ASTRID Provider:     ______________________________________________________________________________________        MR right knee without contrast 2/6/2018 10:28 AM     Techniques: Multiplanar multisequence imaging of the right  knee was  obtained without  administration of intra-articular or intravenous  contrast using routing protocol.     History: right knee pain, failing cortisone injection; Chronic pain of  right knee; Chronic pain of right knee      Comparison: Right knee radiograph dated 3/2/2017     Findings:     MENISCI:  Medial meniscus: Complex degenerative tearing of the medial meniscus  with horizontal oblique tear of the posterior body extending to the  posterior horn attachment with parrot-beak appearance superimposed on  a full-thickness radial tear.  Lateral meniscus: Intact.     LIGAMENTS  Cruciate ligaments: Intact.  Medial supporting structures: Intact.  Lateral supporting structures: Intact.     EXTENSOR MECHANISM  Intact.     FLUID  Mild to moderate joint effusion. Popliteal cyst measures 1.8 x 1.4 cm.     OSSEOUS and ARTICULAR STRUCTURES  Bones: No fracture, contusion, or osseous lesion is seen.     Patellofemoral compartment: No hyaline cartilage disease.     Medial compartment: Multiple focal areas of near full-thickness  fissuring of the weightbearing surface of the medial femoral condyle  and medial tibial plateau with adjacent subchondral edema and early  subtle subchondral insufficiency fracture.     Lateral compartment: No high-grade chondromalacia.     ANCILLARY FINDINGS  None.                                                                      Impression:  1. Complex degenerative tearing of the medial meniscus with horizontal  oblique tear of the body extending to  the posterior horn attachment  superimposed on a full-thickness radial tear. Medial compartment  high-grade cartilage abnormalities with multifocal full-thickness  fissuring and areas of near full-thickness cartilage loss of the  weightbearing surface of the medial femoral condyle and medial tibial  plateau with adjacent subchondral edema and early subtle subchondral  insufficiency fracture.  2. Lateral and patellofemoral compartment are relatively intact.  3.Mild to moderate joint effusion.  4. Small popliteal cyst measures up to 1.8 cm.     I have personally reviewed the examination and initial interpretation  and I agree with the findings.     JUTTA ELLERMANN, MD     ______________________________________________________________________________________  End of diagnostic Imaging report for accession:  [id number]      ______________________________________________________________________________________       Read By: Ellermann, Jutta M, MD Wang, Xiao, MD  Signed by: Jutta M Ellermann on 2/6/2018  1:49 PM         Video-Visit Details    Type of service:  Video Visit   Originating Location (pt. Location): Home    Distant Location (provider location):  Off-site  Platform used for Video Visit: [facility]  Signed Electronically by: Patircia Lira MD PhD

## 2024-11-06 DIAGNOSIS — M25.561 RIGHT KNEE PAIN, UNSPECIFIED CHRONICITY: Primary | ICD-10-CM

## 2024-11-08 ENCOUNTER — OFFICE VISIT (OUTPATIENT)
Dept: ORTHOPEDICS | Facility: CLINIC | Age: 39
End: 2024-11-08
Attending: INTERNAL MEDICINE
Payer: COMMERCIAL

## 2024-11-08 ENCOUNTER — ANCILLARY PROCEDURE (OUTPATIENT)
Dept: GENERAL RADIOLOGY | Facility: CLINIC | Age: 39
End: 2024-11-08
Attending: NURSE PRACTITIONER
Payer: COMMERCIAL

## 2024-11-08 VITALS
BODY MASS INDEX: 30.7 KG/M2 | SYSTOLIC BLOOD PRESSURE: 132 MMHG | TEMPERATURE: 98.2 F | DIASTOLIC BLOOD PRESSURE: 86 MMHG | WEIGHT: 191 LBS | HEIGHT: 66 IN

## 2024-11-08 DIAGNOSIS — M25.561 RIGHT KNEE PAIN, UNSPECIFIED CHRONICITY: ICD-10-CM

## 2024-11-08 DIAGNOSIS — M17.12 PRIMARY OSTEOARTHRITIS OF LEFT KNEE: ICD-10-CM

## 2024-11-08 DIAGNOSIS — M17.11 PRIMARY OSTEOARTHRITIS OF RIGHT KNEE: Primary | ICD-10-CM

## 2024-11-08 PROCEDURE — 20610 DRAIN/INJ JOINT/BURSA W/O US: CPT | Mod: LT | Performed by: ORTHOPAEDIC SURGERY

## 2024-11-08 PROCEDURE — 73564 X-RAY EXAM KNEE 4 OR MORE: CPT | Mod: TC | Performed by: RADIOLOGY

## 2024-11-08 PROCEDURE — 99203 OFFICE O/P NEW LOW 30 MIN: CPT | Mod: 25 | Performed by: ORTHOPAEDIC SURGERY

## 2024-11-08 RX ORDER — BUPIVACAINE HYDROCHLORIDE 5 MG/ML
3 INJECTION, SOLUTION PERINEURAL
Status: COMPLETED | OUTPATIENT
Start: 2024-11-08 | End: 2024-11-08

## 2024-11-08 RX ORDER — TRIAMCINOLONE ACETONIDE 40 MG/ML
40 INJECTION, SUSPENSION INTRA-ARTICULAR; INTRAMUSCULAR
Status: COMPLETED | OUTPATIENT
Start: 2024-11-08 | End: 2024-11-08

## 2024-11-08 RX ADMIN — BUPIVACAINE HYDROCHLORIDE 3 ML: 5 INJECTION, SOLUTION PERINEURAL at 11:24

## 2024-11-08 RX ADMIN — TRIAMCINOLONE ACETONIDE 40 MG: 40 INJECTION, SUSPENSION INTRA-ARTICULAR; INTRAMUSCULAR at 11:24

## 2024-11-08 NOTE — PROGRESS NOTES
"ORTHOPEDIC CONSULT      Chief Complaint: Kenn Dobson is a 39 year old male who is being seen for   Chief Complaint   Patient presents with    Right Knee - Pain       History of Present Illness:   Presents for bilateral knee pain right much worse than left.  History of many years worth of right knee pain.  Previous arthroscopies around 2008 2010 with meniscectomies.  Over last 3-5 years increasing severity of pain.  Causes him to limp.  Wakes him at night.  Decreasing his activity due to it.  He said previous Tylenol, ibuprofen, steroid injections.  It was a  for wrestling and as result has been excellent physical condition.  Also complains of left knee.  Medial location.  Previous arthroscopy.  Does cause him to limp.      Patient's past medical, surgical, social and family histories reviewed.     Past Medical History:   Diagnosis Date    Anxiety     Bipolar affective (H)     Emotional disorder (H)     Schizoaffective disorder (H)        No past surgical history on file.    Medications:  Current Outpatient Medications   Medication Sig Dispense Refill    tiZANidine (ZANAFLEX) 4 MG tablet Take 1 tablet (4 mg) by mouth 3 times daily. 30 tablet 0     No current facility-administered medications for this visit.       Allergies   Allergen Reactions    Dogs        Social History     Occupational History    Not on file   Tobacco Use    Smoking status: Former    Smokeless tobacco: Current   Substance and Sexual Activity    Alcohol use: Not Currently     Comment: Drinks daily    Drug use: No    Sexual activity: Yes     Partners: Female       Family History   Problem Relation Age of Onset    Liver Cancer Maternal Grandfather        REVIEW OF SYSTEMS  10 point review systems performed otherwise negative as noted as per history of present illness.    Physical Exam:  Vitals: /86   Temp 98.2  F (36.8  C)   Ht 1.68 m (5' 6.14\")   Wt 86.6 kg (191 lb)   BMI 30.70 kg/m    BMI= Body mass index is 30.7 " kg/m .  Constitutional: healthy, alert and no acute distress   Psychiatric: mentation appears normal and affect normal/bright  NEURO: no focal deficits  RESP: Normal with easy respirations and no use of accessory muscles to breathe, no audible wheezing or retractions  CV: bilateral lower extremity:   no edema         Regular rate and rhythm by palpation  SKIN: No erythema, rashes, excoriation, or breakdown. No evidence of infection.   JOINT/EXTREMITIES:bilateral knees: No evidence infection.  Active motion 0-125 degrees of motion.  Patella tracks midline.  Varus alignment on the right.  Some medial joint line tenderness.  Left shows some mild tenderness medially.  Pseudolaxity medial valgus stressing on the right.  Collateral ligaments are intact though.  Negative Lachman.     GAIT: not tested     Diagnostic Modalities:  Right knee x-ray: Weightbearing views taken in the clinic shows bone-on-bone medial compartment arthritis.  Lateral and patellofemoral are well-preserved    MR right knee without contrast 2/6/2018 10:28 AM     Techniques: Multiplanar multisequence imaging of the right  knee was  obtained without  administration of intra-articular or intravenous  contrast using routing protocol.     History: right knee pain, failing cortisone injection; Chronic pain of  right knee; Chronic pain of right knee      Comparison: Right knee radiograph dated 3/2/2017     Findings:     MENISCI:  Medial meniscus: Complex degenerative tearing of the medial meniscus  with horizontal oblique tear of the posterior body extending to the  posterior horn attachment with parrot-beak appearance superimposed on  a full-thickness radial tear.  Lateral meniscus: Intact.     LIGAMENTS  Cruciate ligaments: Intact.  Medial supporting structures: Intact.  Lateral supporting structures: Intact.     EXTENSOR MECHANISM  Intact.     FLUID  Mild to moderate joint effusion. Popliteal cyst measures 1.8 x 1.4 cm.     OSSEOUS and ARTICULAR  STRUCTURES  Bones: No fracture, contusion, or osseous lesion is seen.     Patellofemoral compartment: No hyaline cartilage disease.     Medial compartment: Multiple focal areas of near full-thickness  fissuring of the weightbearing surface of the medial femoral condyle  and medial tibial plateau with adjacent subchondral edema and early  subtle subchondral insufficiency fracture.     Lateral compartment: No high-grade chondromalacia.     ANCILLARY FINDINGS  None.                                                                      Impression:  1. Complex degenerative tearing of the medial meniscus with horizontal  oblique tear of the body extending to the posterior horn attachment  superimposed on a full-thickness radial tear. Medial compartment  high-grade cartilage abnormalities with multifocal full-thickness  fissuring and areas of near full-thickness cartilage loss of the  weightbearing surface of the medial femoral condyle and medial tibial  plateau with adjacent subchondral edema and early subtle subchondral  insufficiency fracture.  2. Lateral and patellofemoral compartment are relatively intact.  3.Mild to moderate joint effusion.  4. Small popliteal cyst measures up to 1.8 cm.     Independent visualization of the images was performed.      Impression: right knee primary osteoarthritis-mainly medial compartment  Left knee primary osteoarthritis    Plan:  All of the above pertinent physical exam and imaging modalities findings was reviewed with Kenn.    We discussed his left knee.  He has had no previous injections there.  Radiographs do show some degenerative narrowing.  Previous arthroscopy with partial medial meniscectomy.  Recommend attempting the injection.  Certainly could consider viscosupplementation or PRP in the future.                                          INJECTION PROCEDURE:  The patient was counseled about an  injection, including discussion of risks (including infection), contents of the  injection, rationale for performing the injection, and expected benefits of the injection. The skin was prepped with alcohol and betadine and then utilizing sterile technique an injection of the left knee joint from the anterolateral approach in the seated position was performed. The injection consisted 1ml of Kenalog (40mg per 1 ml) mixed with 3ml of 0.5% Marcaine. The patient tolerated the injection well, and there were no complications. The injection site was covered with a Band-Aid. The injection was performed by Alfonzo Mosquera, APRN, CNP, DNP      For his right knee-bone-on-bone medial compartment arthritis which is consistent with his clinical exam.  Severe pain limiting his activities.  He is attempted rest, activity modification, intra-articular steroid injections, Tylenol and ibuprofen as well as aggressive exercise with no improvement.  Pain impacts his quality of life.  He finds himself decreasing his activities.  He is limping.  Given his pain that is refractory to conservative care impacting her quality of life with a clinical exam consistent with his radiographs we discussed proceeding with right knee partial replacement medial unicompartmental.  The risk, benefits, complications discussed risk including bleeding, infection, stiffness.  We discussed the potential for for work knee done in the future with revision surgery given his young age.    Once carefully reviewed as well as postoperative timeframe for recovery is opted proceed.  Anticipate being off of work 6 weeks afterwards.  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  In the meantime we did provide him prescription of Zanaflex.    Return to clinic 14 days post-operatively. , or sooner as needed for changes.  Re-x-ray on return: No    Maicol Marie D.O.

## 2024-11-08 NOTE — PROGRESS NOTES
Large Joint Injection/Arthocentesis: L knee joint    Date/Time: 11/8/2024 11:24 AM    Performed by: Felipe Mosquera APRN CNP  Authorized by: Zac aMrie DO    Indications:  Pain  Needle Size:  25 G  Guidance: landmark guided    Approach:  Anterolateral  Location:  Knee      Medications:  40 mg triamcinolone 40 MG/ML; 3 mL BUPivacaine 0.5 %  Medications comment:  4ml 0.5% bupivicaine  NDC:54016-329-50  Lot: [id number]  3/30/20    Outcome:  Tolerated well, no immediate complications  Procedure discussed: discussed risks, benefits, and alternatives    Consent Given by:  Patient  Timeout: timeout called immediately prior to procedure    Prep: patient was prepped and draped in usual sterile fashion

## 2024-11-08 NOTE — LETTER
11/8/2024      Kenn Dobson  [address] [facility] [address]      Dear Colleague,    Thank you for referring your patient, Kenn Dobson, to the [facility]. Please see a copy of my visit note below.    ORTHOPEDIC CONSULT      Chief Complaint: Kenn Dobson is a 39 year old male who is being seen for   Chief Complaint   Patient presents with     Right Knee - Pain       History of Present Illness:   Presents for bilateral knee pain right much worse than left.  History of many years worth of right knee pain.  Previous arthroscopies around 2008 2010 with meniscectomies.  Over last 3-5 years increasing severity of pain.  Causes him to limp.  Wakes him at night.  Decreasing his activity due to it.  He said previous Tylenol, ibuprofen, steroid injections.  It was a  for wrestling and as result has been excellent physical condition.  Also complains of left knee.  Medial location.  Previous arthroscopy.  Does cause him to limp.      Patient's past medical, surgical, social and family histories reviewed.     Past Medical History:   Diagnosis Date     Anxiety      Bipolar affective (H)      Emotional disorder (H)      Schizoaffective disorder (H)        No past surgical history on file.    Medications:  Current Outpatient Medications   Medication Sig Dispense Refill     tiZANidine (ZANAFLEX) 4 MG tablet Take 1 tablet (4 mg) by mouth 3 times daily. 30 tablet 0     No current facility-administered medications for this visit.       Allergies   Allergen Reactions     Dogs        Social History     Occupational History     Not on file   Tobacco Use     Smoking status: Former     Smokeless tobacco: Current   Substance and Sexual Activity     Alcohol use: Not Currently     Comment: Drinks daily     Drug use: No     Sexual activity: Yes     Partners: Female       Family History   Problem Relation Age of Onset     Liver Cancer Maternal Grandfather        REVIEW OF SYSTEMS  10 point  "review systems performed otherwise negative as noted as per history of present illness.    Physical Exam:  Vitals: /86   Temp 98.2  F (36.8  C)   Ht 1.68 m (5' 6.14\")   Wt 86.6 kg (191 lb)   BMI 30.70 kg/m    BMI= Body mass index is 30.7 kg/m .  Constitutional: healthy, alert and no acute distress   Psychiatric: mentation appears normal and affect normal/bright  NEURO: no focal deficits  RESP: Normal with easy respirations and no use of accessory muscles to breathe, no audible wheezing or retractions  CV: bilateral lower extremity:   no edema         Regular rate and rhythm by palpation  SKIN: No erythema, rashes, excoriation, or breakdown. No evidence of infection.   JOINT/EXTREMITIES:bilateral knees: No evidence infection.  Active motion 0-125 degrees of motion.  Patella tracks midline.  Varus alignment on the right.  Some medial joint line tenderness.  Left shows some mild tenderness medially.  Pseudolaxity medial valgus stressing on the right.  Collateral ligaments are intact though.  Negative Lachman.     GAIT: not tested     Diagnostic Modalities:  Right knee x-ray: Weightbearing views taken in the clinic shows bone-on-bone medial compartment arthritis.  Lateral and patellofemoral are well-preserved    MR right knee without contrast 2/6/2018 10:28 AM     Techniques: Multiplanar multisequence imaging of the right  knee was  obtained without  administration of intra-articular or intravenous  contrast using routing protocol.     History: right knee pain, failing cortisone injection; Chronic pain of  right knee; Chronic pain of right knee      Comparison: Right knee radiograph dated 3/2/2017     Findings:     MENISCI:  Medial meniscus: Complex degenerative tearing of the medial meniscus  with horizontal oblique tear of the posterior body extending to the  posterior horn attachment with parrot-beak appearance superimposed on  a full-thickness radial tear.  Lateral meniscus: Intact.     LIGAMENTS  Cruciate " ligaments: Intact.  Medial supporting structures: Intact.  Lateral supporting structures: Intact.     EXTENSOR MECHANISM  Intact.     FLUID  Mild to moderate joint effusion. Popliteal cyst measures 1.8 x 1.4 cm.     OSSEOUS and ARTICULAR STRUCTURES  Bones: No fracture, contusion, or osseous lesion is seen.     Patellofemoral compartment: No hyaline cartilage disease.     Medial compartment: Multiple focal areas of near full-thickness  fissuring of the weightbearing surface of the medial femoral condyle  and medial tibial plateau with adjacent subchondral edema and early  subtle subchondral insufficiency fracture.     Lateral compartment: No high-grade chondromalacia.     ANCILLARY FINDINGS  None.                                                                      Impression:  1. Complex degenerative tearing of the medial meniscus with horizontal  oblique tear of the body extending to the posterior horn attachment  superimposed on a full-thickness radial tear. Medial compartment  high-grade cartilage abnormalities with multifocal full-thickness  fissuring and areas of near full-thickness cartilage loss of the  weightbearing surface of the medial femoral condyle and medial tibial  plateau with adjacent subchondral edema and early subtle subchondral  insufficiency fracture.  2. Lateral and patellofemoral compartment are relatively intact.  3.Mild to moderate joint effusion.  4. Small popliteal cyst measures up to 1.8 cm.     Independent visualization of the images was performed.      Impression: right knee primary osteoarthritis-mainly medial compartment  Left knee primary osteoarthritis    Plan:  All of the above pertinent physical exam and imaging modalities findings was reviewed with Kenn.    We discussed his left knee.  He has had no previous injections there.  Radiographs do show some degenerative narrowing.  Previous arthroscopy with partial medial meniscectomy.  Recommend attempting the injection.  Certainly  could consider viscosupplementation or PRP in the future.                                          INJECTION PROCEDURE:  The patient was counseled about an  injection, including discussion of risks (including infection), contents of the injection, rationale for performing the injection, and expected benefits of the injection. The skin was prepped with alcohol and betadine and then utilizing sterile technique an injection of the left knee joint from the anterolateral approach in the seated position was performed. The injection consisted 1ml of Kenalog (40mg per 1 ml) mixed with 3ml of 0.5% Marcaine. The patient tolerated the injection well, and there were no complications. The injection site was covered with a Band-Aid. The injection was performed by Alfonzo Mosquera, APRN, CNP, DNP      For his right knee-bone-on-bone medial compartment arthritis which is consistent with his clinical exam.  Severe pain limiting his activities.  He is attempted rest, activity modification, intra-articular steroid injections, Tylenol and ibuprofen as well as aggressive exercise with no improvement.  Pain impacts his quality of life.  He finds himself decreasing his activities.  He is limping.  Given his pain that is refractory to conservative care impacting her quality of life with a clinical exam consistent with his radiographs we discussed proceeding with right knee partial replacement medial unicompartmental.  The risk, benefits, complications discussed risk including bleeding, infection, stiffness.  We discussed the potential for for work knee done in the future with revision surgery given his young age.    Once carefully reviewed as well as postoperative timeframe for recovery is opted proceed.  Anticipate being off of work 6 weeks afterwards.  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  In the meantime we did provide him prescription of Zanaflex.    Return to clinic 14 days post-operatively. , or sooner as needed  for changes.  Re-x-ray on return: No    Maicol Marie D.O.    Large Joint Injection/Arthocentesis: L knee joint    Date/Time: 11/8/2024 11:24 AM    Performed by: Felipe Mosquera APRN CNP  Authorized by: Zac Marie DO    Indications:  Pain  Needle Size:  25 G  Guidance: landmark guided    Approach:  Anterolateral  Location:  Knee      Medications:  40 mg triamcinolone 40 MG/ML; 3 mL BUPivacaine 0.5 %  Medications comment:  4ml 0.5% bupivicaine  NDC:46515-509-59  Lot: [id number]  3/30/20    Outcome:  Tolerated well, no immediate complications  Procedure discussed: discussed risks, benefits, and alternatives    Consent Given by:  Patient  Timeout: timeout called immediately prior to procedure    Prep: patient was prepped and draped in usual sterile fashion          Again, thank you for allowing me to participate in the care of your patient.        Sincerely,        Zac Marei DO

## 2024-11-11 ENCOUNTER — TELEPHONE (OUTPATIENT)
Dept: ORTHOPEDICS | Facility: OTHER | Age: 39
End: 2024-11-11
Payer: COMMERCIAL

## 2024-11-11 NOTE — TELEPHONE ENCOUNTER
Type of surgery: right knee shahid assisted partial knee replacement   Location of surgery: [facility]  Date and time of surgery: 12/3  Surgeon: Frank  Pre-Op Appt Date: 11/21  Post-Op Appt Date: 12/18   Packet sent out: Yes  Pre-cert/Authorization completed:  Not Applicable  Date: na

## 2024-11-15 ENCOUNTER — HOSPITAL ENCOUNTER (OUTPATIENT)
Dept: CT IMAGING | Facility: CLINIC | Age: 39
Discharge: HOME | End: 2024-11-15
Attending: NURSE PRACTITIONER | Admitting: NURSE PRACTITIONER
Payer: COMMERCIAL

## 2024-11-15 DIAGNOSIS — M17.11 PRIMARY OSTEOARTHRITIS OF RIGHT KNEE: ICD-10-CM

## 2024-11-15 PROCEDURE — 73700 CT LOWER EXTREMITY W/O DYE: CPT | Mod: RT

## 2024-11-16 ENCOUNTER — HEALTH MAINTENANCE LETTER (OUTPATIENT)
Age: 39
End: 2024-11-16

## 2024-11-21 ENCOUNTER — TELEPHONE (OUTPATIENT)
Dept: FAMILY MEDICINE | Facility: CLINIC | Age: 39
End: 2024-11-21

## 2024-11-21 ENCOUNTER — MYC MEDICAL ADVICE (OUTPATIENT)
Dept: FAMILY MEDICINE | Facility: CLINIC | Age: 39
End: 2024-11-21

## 2024-11-21 ENCOUNTER — OFFICE VISIT (OUTPATIENT)
Dept: FAMILY MEDICINE | Facility: CLINIC | Age: 39
End: 2024-11-21
Payer: COMMERCIAL

## 2024-11-21 VITALS
OXYGEN SATURATION: 99 % | RESPIRATION RATE: 16 BRPM | HEIGHT: 66 IN | WEIGHT: 189.8 LBS | SYSTOLIC BLOOD PRESSURE: 136 MMHG | DIASTOLIC BLOOD PRESSURE: 86 MMHG | HEART RATE: 82 BPM | BODY MASS INDEX: 30.5 KG/M2 | TEMPERATURE: 97.8 F

## 2024-11-21 DIAGNOSIS — M25.561 CHRONIC PAIN OF RIGHT KNEE: ICD-10-CM

## 2024-11-21 DIAGNOSIS — G89.29 CHRONIC PAIN OF RIGHT KNEE: ICD-10-CM

## 2024-11-21 DIAGNOSIS — R79.89 ELEVATED SERUM CREATININE: ICD-10-CM

## 2024-11-21 DIAGNOSIS — Z01.818 PREOPERATIVE EXAMINATION: Primary | ICD-10-CM

## 2024-11-21 DIAGNOSIS — M17.11 PRIMARY OSTEOARTHRITIS OF RIGHT KNEE: ICD-10-CM

## 2024-11-21 LAB
ANION GAP SERPL CALCULATED.3IONS-SCNC: 11 MMOL/L (ref 7–15)
BUN SERPL-MCNC: 18.8 MG/DL (ref 6–20)
CALCIUM SERPL-MCNC: 9.3 MG/DL (ref 8.8–10.4)
CHLORIDE SERPL-SCNC: 104 MMOL/L (ref 98–107)
CREAT SERPL-MCNC: 1.45 MG/DL (ref 0.67–1.17)
EGFRCR SERPLBLD CKD-EPI 2021: 64 ML/MIN/1.73M2
GLUCOSE SERPL-MCNC: 98 MG/DL (ref 70–99)
HCO3 SERPL-SCNC: 26 MMOL/L (ref 22–29)
HGB BLD-MCNC: 17.1 G/DL (ref 13.3–17.7)
POTASSIUM SERPL-SCNC: 3.9 MMOL/L (ref 3.4–5.3)
SODIUM SERPL-SCNC: 141 MMOL/L (ref 135–145)

## 2024-11-21 ASSESSMENT — PAIN SCALES - GENERAL: PAINLEVEL_OUTOF10: EXTREME PAIN (8)

## 2024-11-21 NOTE — PATIENT INSTRUCTIONS
How to Take Your Medication Before Surgery  Preoperative Medication Instructions   Antiplatelet or Anticoagulation Medication Instructions   - Patient is on no antiplatelet or anticoagulation medications.    Additional Medication Instructions  Patient is on no additional chronic medications     Instructed patient to cancel surgery and reschedule if develops high fever or is vomiting 1-3 days before surgery. Pre Operative History and Physical is good for 30 days.    Other Pre-Op Orders for when to stop eating the night before surgery, time of surgery, where & when to check in, parking, and any other specific pre-operative orders come from the surgeon's office. You should hear from them at least one day before surgery if not sooner for these specific instructions.    STOP any types of over the counter blood thinning medications (such as aspirin, Motrin/ibuprofen, Aleve/naproxen, vitamin E, or fish oil) 1 week prior to surgery. Tylenol (or acetaminophen) is okay to take for pain if needed    Recommend no alcohol consumption at least 48 hours prior to surgery

## 2024-11-21 NOTE — H&P (VIEW-ONLY)
Preoperative Evaluation  [address] [address]  Phone: [phone]  Fax: [phone]  Primary Provider: Physician No Ref-Primary  Pre-op Performing Provider: Lula Ansari NP  Nov 21, 2024 11/18/2024   Surgical Information   What procedure is being done? Partial knee replacemnt on right knee (medial).    Facility or Hospital where procedure/surgery will be performed: [facility]    Who is doing the procedure / surgery? Dr. Marie   Date of surgery / procedure: 12/03/2024    Time of surgery / procedure: 9:45 AM    Where do you plan to recover after surgery? at home with family        Patient-reported     Fax number for surgical facility: Note does not need to be faxed, will be available electronically in Epic.    Assessment & Plan     The proposed surgical procedure is considered INTERMEDIATE risk.    Preoperative examination  - Basic metabolic panel  (Ca, Cl, CO2, Creat, Gluc, K, Na, BUN); Future  - Hemoglobin; Future  - Basic metabolic panel  (Ca, Cl, CO2, Creat, Gluc, K, Na, BUN)  - Hemoglobin    Primary osteoarthritis of right knee    Chronic pain of right knee    Elevated serum creatinine  Creatinine elevated on exam today. Reviewed labs from previous with slight elevation, more pronounced on exam today at 1.46, GFR 64. Increase fluid intake. Recheck in 1 week. Establish with primary care provider for ongoing follow-up        - No identified additional risk factors other than previously addressed    Preoperative Medication Instructions  Antiplatelet or Anticoagulation Medication Instructions   - Patient is on no antiplatelet or anticoagulation medications.    Additional Medication Instructions  Patient is on no additional chronic medications    Recommendation  Approval given to proceed with proposed procedure, without further diagnostic evaluation.    The longitudinal plan of care for the diagnosis(es)/condition(s) as documented were addressed  during this visit. Due to the added complexity in care, I will continue to support Kenn in the subsequent management and with ongoing continuity of care.    Mirlande Hawk is a 39 year old, presenting for the following:  Pre-Op Exam          11/21/2024     3:07 PM   Additional Questions   Roomed by Scott ANAYA related to upcoming procedure: Kenn has a history of chronic right knee pain and previous meniscal injury. He has undergone injections in the knee without significant improvement and surgical intervention has been recommended. He has been using Tizanidine as needed for pain.         11/18/2024   Pre-Op Questionnaire   Have you ever had a heart attack or stroke? No    Have you ever had surgery on your heart or blood vessels, such as a stent placement, a coronary artery bypass, or surgery on an artery in your head, neck, heart, or legs? No    Do you have chest pain with activity? No    Do you have a history of heart failure? No    Do you currently have a cold, bronchitis or symptoms of other infection? No    Do you have a cough, shortness of breath, or wheezing? No    Do you or anyone in your family have previous history of blood clots? No    Do you or does anyone in your family have a serious bleeding problem such as prolonged bleeding following surgeries or cuts? No    Have you ever had problems with anemia or been told to take iron pills? No    Have you had any abnormal blood loss such as black, tarry or bloody stools? No    Have you ever had a blood transfusion? No    Are you willing to have a blood transfusion if it is medically needed before, during, or after your surgery? Yes    Have you or any of your relatives ever had problems with anesthesia? No    Do you have sleep apnea, excessive snoring or daytime drowsiness? No    Do you have any artifical heart valves or other implanted medical devices like a pacemaker, defibrillator, or continuous glucose monitor? No    Do you have artificial  "joints? No    Are you allergic to latex? No        Patient-reported     Health Care Directive  Patient does not have a Health Care Directive: Discussed advance care planning with patient; however, patient declined at this time.    Preoperative Review of    reviewed - no record of controlled substances prescribed.          Patient Active Problem List    Diagnosis Date Noted    Cauliflower right ear 05/30/2014     Priority: Medium    ED (erectile dysfunction) 05/30/2014     Priority: Medium    Internal hemorrhoid 05/30/2014     Priority: Medium    Multiple joint pain 05/30/2014     Priority: Medium      Past Medical History:   Diagnosis Date    Anxiety     Bipolar affective (H)     Emotional disorder (H)     Schizoaffective disorder (H)      No past surgical history on file.  Current Outpatient Medications   Medication Sig Dispense Refill    tiZANidine (ZANAFLEX) 4 MG tablet Take 1 tablet (4 mg) by mouth 3 times daily. 30 tablet 0       Allergies   Allergen Reactions    Dogs         Social History     Tobacco Use    Smoking status: Never    Smokeless tobacco: Never   Substance Use Topics    Alcohol use: Not Currently     Comment: Drinks daily       History   Drug Use No         Review of Systems  Constitutional, HEENT, cardiovascular, pulmonary, gi and gu systems are negative, except as otherwise noted.    Objective    BP (!) 141/95   Pulse 82   Temp 97.8  F (36.6  C) (Temporal)   Resp 16   Ht 1.68 m (5' 6.14\")   Wt 86.1 kg (189 lb 12.8 oz)   SpO2 99%   BMI 30.50 kg/m     Estimated body mass index is 30.5 kg/m  as calculated from the following:    Height as of this encounter: 1.68 m (5' 6.14\").    Weight as of this encounter: 86.1 kg (189 lb 12.8 oz).  Physical Exam  GENERAL: alert and no distress  EYES: Eyes grossly normal to inspection, PERRL and conjunctivae and sclerae normal  HENT: ear canals and TM's normal, nose and mouth without ulcers or lesions  NECK: no adenopathy, no asymmetry, masses, or " "scars  RESP: lungs clear to auscultation - no rales, rhonchi or wheezes  CV: regular rate and rhythm, normal S1 S2, no S3 or S4, no murmur, click or rub, no peripheral edema  ABDOMEN: soft, nontender, no hepatosplenomegaly, no masses and bowel sounds normal  MS: no gross musculoskeletal defects noted, no edema  SKIN: no suspicious lesions or rashes  NEURO: Normal strength and tone, mentation intact and speech normal  PSYCH: mentation appears normal, affect normal/bright    No results for input(s): \"HGB\", \"PLT\", \"INR\", \"NA\", \"POTASSIUM\", \"CR\", \"A1C\" in the last 8760 hours.     Diagnostics  Recent Results (from the past 24 hours)   Basic metabolic panel  (Ca, Cl, CO2, Creat, Gluc, K, Na, BUN)    Collection Time: 11/21/24  3:44 PM   Result Value Ref Range    Sodium 141 135 - 145 mmol/L    Potassium 3.9 3.4 - 5.3 mmol/L    Chloride 104 98 - 107 mmol/L    Carbon Dioxide (CO2) 26 22 - 29 mmol/L    Anion Gap 11 7 - 15 mmol/L    Urea Nitrogen 18.8 6.0 - 20.0 mg/dL    Creatinine 1.45 (H) 0.67 - 1.17 mg/dL    GFR Estimate 64 >60 mL/min/1.73m2    Calcium 9.3 8.8 - 10.4 mg/dL    Glucose 98 70 - 99 mg/dL   Hemoglobin    Collection Time: 11/21/24  3:44 PM   Result Value Ref Range    Hemoglobin 17.1 13.3 - 17.7 g/dL      No EKG required, no history of coronary heart disease, significant arrhythmia, peripheral arterial disease or other structural heart disease.    Revised Cardiac Risk Index (RCRI)  The patient has the following serious cardiovascular risks for perioperative complications:   - No serious cardiac risks = 0 points     RCRI Interpretation: 0 points: Class I (very low risk - 0.4% complication rate)         Signed Electronically by: Lula Ansari NP  A copy of this evaluation report is provided to the requesting physician.         "

## 2024-11-21 NOTE — PROGRESS NOTES
Preoperative Evaluation  [address] [address]  Phone: [phone]  Fax: [phone]  Primary Provider: Physician No Ref-Primary  Pre-op Performing Provider: Lula Ansari NP  Nov 21, 2024 11/18/2024   Surgical Information   What procedure is being done? Partial knee replacemnt on right knee (medial).    Facility or Hospital where procedure/surgery will be performed: [facility]    Who is doing the procedure / surgery? Dr. Marie   Date of surgery / procedure: 12/03/2024    Time of surgery / procedure: 9:45 AM    Where do you plan to recover after surgery? at home with family        Patient-reported     Fax number for surgical facility: Note does not need to be faxed, will be available electronically in Epic.    Assessment & Plan     The proposed surgical procedure is considered INTERMEDIATE risk.    Preoperative examination  - Basic metabolic panel  (Ca, Cl, CO2, Creat, Gluc, K, Na, BUN); Future  - Hemoglobin; Future  - Basic metabolic panel  (Ca, Cl, CO2, Creat, Gluc, K, Na, BUN)  - Hemoglobin    Primary osteoarthritis of right knee    Chronic pain of right knee    Elevated serum creatinine  Creatinine elevated on exam today. Reviewed labs from previous with slight elevation, more pronounced on exam today at 1.46, GFR 64. Increase fluid intake. Recheck in 1 week. Establish with primary care provider for ongoing follow-up        - No identified additional risk factors other than previously addressed    Preoperative Medication Instructions  Antiplatelet or Anticoagulation Medication Instructions   - Patient is on no antiplatelet or anticoagulation medications.    Additional Medication Instructions  Patient is on no additional chronic medications    Recommendation  Approval given to proceed with proposed procedure, without further diagnostic evaluation.    The longitudinal plan of care for the diagnosis(es)/condition(s) as documented were addressed  during this visit. Due to the added complexity in care, I will continue to support Kenn in the subsequent management and with ongoing continuity of care.    Mirlande Hawk is a 39 year old, presenting for the following:  Pre-Op Exam          11/21/2024     3:07 PM   Additional Questions   Roomed by Scott ANAYA related to upcoming procedure: Kenn has a history of chronic right knee pain and previous meniscal injury. He has undergone injections in the knee without significant improvement and surgical intervention has been recommended. He has been using Tizanidine as needed for pain.         11/18/2024   Pre-Op Questionnaire   Have you ever had a heart attack or stroke? No    Have you ever had surgery on your heart or blood vessels, such as a stent placement, a coronary artery bypass, or surgery on an artery in your head, neck, heart, or legs? No    Do you have chest pain with activity? No    Do you have a history of heart failure? No    Do you currently have a cold, bronchitis or symptoms of other infection? No    Do you have a cough, shortness of breath, or wheezing? No    Do you or anyone in your family have previous history of blood clots? No    Do you or does anyone in your family have a serious bleeding problem such as prolonged bleeding following surgeries or cuts? No    Have you ever had problems with anemia or been told to take iron pills? No    Have you had any abnormal blood loss such as black, tarry or bloody stools? No    Have you ever had a blood transfusion? No    Are you willing to have a blood transfusion if it is medically needed before, during, or after your surgery? Yes    Have you or any of your relatives ever had problems with anesthesia? No    Do you have sleep apnea, excessive snoring or daytime drowsiness? No    Do you have any artifical heart valves or other implanted medical devices like a pacemaker, defibrillator, or continuous glucose monitor? No    Do you have artificial  "joints? No    Are you allergic to latex? No        Patient-reported     Health Care Directive  Patient does not have a Health Care Directive: Discussed advance care planning with patient; however, patient declined at this time.    Preoperative Review of    reviewed - no record of controlled substances prescribed.          Patient Active Problem List    Diagnosis Date Noted    Cauliflower right ear 05/30/2014     Priority: Medium    ED (erectile dysfunction) 05/30/2014     Priority: Medium    Internal hemorrhoid 05/30/2014     Priority: Medium    Multiple joint pain 05/30/2014     Priority: Medium      Past Medical History:   Diagnosis Date    Anxiety     Bipolar affective (H)     Emotional disorder (H)     Schizoaffective disorder (H)      No past surgical history on file.  Current Outpatient Medications   Medication Sig Dispense Refill    tiZANidine (ZANAFLEX) 4 MG tablet Take 1 tablet (4 mg) by mouth 3 times daily. 30 tablet 0       Allergies   Allergen Reactions    Dogs         Social History     Tobacco Use    Smoking status: Never    Smokeless tobacco: Never   Substance Use Topics    Alcohol use: Not Currently     Comment: Drinks daily       History   Drug Use No         Review of Systems  Constitutional, HEENT, cardiovascular, pulmonary, gi and gu systems are negative, except as otherwise noted.    Objective    BP (!) 141/95   Pulse 82   Temp 97.8  F (36.6  C) (Temporal)   Resp 16   Ht 1.68 m (5' 6.14\")   Wt 86.1 kg (189 lb 12.8 oz)   SpO2 99%   BMI 30.50 kg/m     Estimated body mass index is 30.5 kg/m  as calculated from the following:    Height as of this encounter: 1.68 m (5' 6.14\").    Weight as of this encounter: 86.1 kg (189 lb 12.8 oz).  Physical Exam  GENERAL: alert and no distress  EYES: Eyes grossly normal to inspection, PERRL and conjunctivae and sclerae normal  HENT: ear canals and TM's normal, nose and mouth without ulcers or lesions  NECK: no adenopathy, no asymmetry, masses, or " "scars  RESP: lungs clear to auscultation - no rales, rhonchi or wheezes  CV: regular rate and rhythm, normal S1 S2, no S3 or S4, no murmur, click or rub, no peripheral edema  ABDOMEN: soft, nontender, no hepatosplenomegaly, no masses and bowel sounds normal  MS: no gross musculoskeletal defects noted, no edema  SKIN: no suspicious lesions or rashes  NEURO: Normal strength and tone, mentation intact and speech normal  PSYCH: mentation appears normal, affect normal/bright    No results for input(s): \"HGB\", \"PLT\", \"INR\", \"NA\", \"POTASSIUM\", \"CR\", \"A1C\" in the last 8760 hours.     Diagnostics  Recent Results (from the past 24 hours)   Basic metabolic panel  (Ca, Cl, CO2, Creat, Gluc, K, Na, BUN)    Collection Time: 11/21/24  3:44 PM   Result Value Ref Range    Sodium 141 135 - 145 mmol/L    Potassium 3.9 3.4 - 5.3 mmol/L    Chloride 104 98 - 107 mmol/L    Carbon Dioxide (CO2) 26 22 - 29 mmol/L    Anion Gap 11 7 - 15 mmol/L    Urea Nitrogen 18.8 6.0 - 20.0 mg/dL    Creatinine 1.45 (H) 0.67 - 1.17 mg/dL    GFR Estimate 64 >60 mL/min/1.73m2    Calcium 9.3 8.8 - 10.4 mg/dL    Glucose 98 70 - 99 mg/dL   Hemoglobin    Collection Time: 11/21/24  3:44 PM   Result Value Ref Range    Hemoglobin 17.1 13.3 - 17.7 g/dL      No EKG required, no history of coronary heart disease, significant arrhythmia, peripheral arterial disease or other structural heart disease.    Revised Cardiac Risk Index (RCRI)  The patient has the following serious cardiovascular risks for perioperative complications:   - No serious cardiac risks = 0 points     RCRI Interpretation: 0 points: Class I (very low risk - 0.4% complication rate)         Signed Electronically by: Lula Ansari NP  A copy of this evaluation report is provided to the requesting physician.         "

## 2024-11-21 NOTE — TELEPHONE ENCOUNTER
----- Message from Lula Ansari sent at 11/21/2024  5:35 PM CST -----  Please assist to schedule lab in one week

## 2024-11-22 NOTE — TELEPHONE ENCOUNTER
Attempted contacting patient, no answer, left a message asking for a return call.    Please schedule per provider message below.     Sapna Brooke, GLENYS

## 2024-11-22 NOTE — TELEPHONE ENCOUNTER
Patient did call back. Patient reports it is a huge inconvenience to come back to the clinic to get this lab rechecked. He [place] away.  He said he knows he has been dehydrated. He said if it is not going to affect him having the surgery he would like to forgo having a recheck done.

## 2024-11-25 NOTE — TELEPHONE ENCOUNTER
LVM and MyChart message sent to patient. Per provider it is recommend that he follows up with a provider closer to his home for a recheck.     Scott Thomas, VF

## 2024-11-25 NOTE — TELEPHONE ENCOUNTER
I don't believe there is any specific waiver to sign. I would recommend he follow-up with a provider closer to home if [place] is not convenient for him.

## 2024-11-26 NOTE — PROGRESS NOTES
1 week prior to right knee partial replacement    Reviewed pre-op H&P. Creatinine was noted as elevated at 1.46. was recommended for lab recheck. Patient declined the lab recheck because he saw that he was cleared for surgery.  He reports was confused as if he needed the lab or not.   Given his creatinine was increased from 1.19 to 1.45 recommend that he get the lab and recheck to ensure not trending in the wrong way prior to surgery.     Did offer [facility] for lab and physical therapy. He declined both.       Fast Track discharge. Has family at home to help.  Post-op physical therapy: [facility].  Will place order.       DVT prevention will be pending on creatinine and kidney.       Will mychart patient with lab results.       NICHOLAS Marinelli, CNP  Orthopedic Surgery

## 2024-11-26 NOTE — TELEPHONE ENCOUNTER
He is cleared for surgery. I would recommend he find a PCP to follow with, however this is not required prior to surgery.

## 2024-11-27 ENCOUNTER — VIRTUAL VISIT (OUTPATIENT)
Dept: ORTHOPEDICS | Facility: CLINIC | Age: 39
End: 2024-11-27
Payer: COMMERCIAL

## 2024-11-27 DIAGNOSIS — M17.11 PRIMARY OSTEOARTHRITIS OF RIGHT KNEE: Primary | ICD-10-CM

## 2024-11-27 RX ORDER — FLUTICASONE PROPIONATE 50 MCG
1 SPRAY, SUSPENSION (ML) NASAL DAILY
COMMUNITY

## 2024-11-27 NOTE — LETTER
11/27/2024      Kenn Dobson  [address]  [place] [address]      Dear Colleague,    Thank you for referring your patient, Kenn Dobson, to the [facility]. Please see a copy of my visit note below.    1 week prior to right knee partial replacement    Reviewed pre-op H&P. Creatinine was noted as elevated at 1.46. was recommended for lab recheck. Patient declined the lab recheck because he saw that he was cleared for surgery.  He reports was confused as if he needed the lab or not.   Given his creatinine was increased from 1.19 to 1.45 recommend that he get the lab and recheck to ensure not trending in the wrong way prior to surgery.     Did offer [facility] for lab and physical therapy. He declined both.       Fast Track discharge. Has family at home to help.  Post-op physical therapy: [place].  Will place order.       DVT prevention will be pending on creatinine and kidney.       Will mychart patient with lab results.       NICHOLAS Marinelli, CNP  Orthopedic Surgery          Again, thank you for allowing me to participate in the care of your patient.        Sincerely,        Zac Marie, DO

## 2024-12-02 ENCOUNTER — ANESTHESIA EVENT (OUTPATIENT)
Dept: SURGERY | Facility: CLINIC | Age: 39
End: 2024-12-02
Payer: COMMERCIAL

## 2024-12-03 ENCOUNTER — APPOINTMENT (OUTPATIENT)
Dept: GENERAL RADIOLOGY | Facility: CLINIC | Age: 39
End: 2024-12-03
Attending: NURSE PRACTITIONER
Payer: COMMERCIAL

## 2024-12-03 ENCOUNTER — ANESTHESIA (OUTPATIENT)
Dept: SURGERY | Facility: CLINIC | Age: 39
End: 2024-12-03
Payer: COMMERCIAL

## 2024-12-03 ENCOUNTER — HOSPITAL ENCOUNTER (OUTPATIENT)
Facility: CLINIC | Age: 39
Discharge: HOME | End: 2024-12-04
Attending: ORTHOPAEDIC SURGERY | Admitting: ORTHOPAEDIC SURGERY
Payer: COMMERCIAL

## 2024-12-03 DIAGNOSIS — Z96.651 S/P RIGHT UNICOMPARTMENTAL KNEE REPLACEMENT: Primary | ICD-10-CM

## 2024-12-03 DIAGNOSIS — M17.11 PRIMARY OSTEOARTHRITIS OF RIGHT KNEE: ICD-10-CM

## 2024-12-03 DIAGNOSIS — M17.12 PRIMARY OSTEOARTHRITIS OF LEFT KNEE: ICD-10-CM

## 2024-12-03 PROCEDURE — 27446 REVISION OF KNEE JOINT: CPT | Mod: AS | Performed by: NURSE PRACTITIONER

## 2024-12-03 PROCEDURE — 370N000017 HC ANESTHESIA TECHNICAL FEE, PER MIN: Performed by: ORTHOPAEDIC SURGERY

## 2024-12-03 PROCEDURE — 250N000011 HC RX IP 250 OP 636: Performed by: NURSE ANESTHETIST, CERTIFIED REGISTERED

## 2024-12-03 PROCEDURE — 250N000011 HC RX IP 250 OP 636: Performed by: NURSE PRACTITIONER

## 2024-12-03 PROCEDURE — 250N000009 HC RX 250: Performed by: ORTHOPAEDIC SURGERY

## 2024-12-03 PROCEDURE — C1776 JOINT DEVICE (IMPLANTABLE): HCPCS | Performed by: ORTHOPAEDIC SURGERY

## 2024-12-03 PROCEDURE — 250N000011 HC RX IP 250 OP 636: Performed by: ORTHOPAEDIC SURGERY

## 2024-12-03 PROCEDURE — 272N000001 HC OR GENERAL SUPPLY STERILE: Performed by: ORTHOPAEDIC SURGERY

## 2024-12-03 PROCEDURE — 271N000001 HC OR GENERAL SUPPLY NON-STERILE: Performed by: ORTHOPAEDIC SURGERY

## 2024-12-03 PROCEDURE — 999N000063 XR KNEE PORT RIGHT 1/2 VIEWS: Mod: RT

## 2024-12-03 PROCEDURE — 250N000013 HC RX MED GY IP 250 OP 250 PS 637: Performed by: NURSE PRACTITIONER

## 2024-12-03 PROCEDURE — 258N000003 HC RX IP 258 OP 636: Performed by: NURSE ANESTHETIST, CERTIFIED REGISTERED

## 2024-12-03 PROCEDURE — 250N000009 HC RX 250: Performed by: NURSE ANESTHETIST, CERTIFIED REGISTERED

## 2024-12-03 PROCEDURE — C1713 ANCHOR/SCREW BN/BN,TIS/BN: HCPCS | Performed by: ORTHOPAEDIC SURGERY

## 2024-12-03 PROCEDURE — 97110 THERAPEUTIC EXERCISES: CPT | Mod: GP

## 2024-12-03 PROCEDURE — 999N000141 HC STATISTIC PRE-PROCEDURE NURSING ASSESSMENT: Performed by: ORTHOPAEDIC SURGERY

## 2024-12-03 PROCEDURE — 258N000003 HC RX IP 258 OP 636: Performed by: NURSE PRACTITIONER

## 2024-12-03 PROCEDURE — 360N000080 HC SURGERY LEVEL 7, PER MIN: Performed by: ORTHOPAEDIC SURGERY

## 2024-12-03 PROCEDURE — 97161 PT EVAL LOW COMPLEX 20 MIN: CPT | Mod: GP

## 2024-12-03 PROCEDURE — 710N000010 HC RECOVERY PHASE 1, LEVEL 2, PER MIN: Performed by: ORTHOPAEDIC SURGERY

## 2024-12-03 PROCEDURE — 258N000003 HC RX IP 258 OP 636: Performed by: ORTHOPAEDIC SURGERY

## 2024-12-03 PROCEDURE — 250N000013 HC RX MED GY IP 250 OP 250 PS 637: Performed by: INTERNAL MEDICINE

## 2024-12-03 PROCEDURE — 27446 REVISION OF KNEE JOINT: CPT | Mod: RT | Performed by: ORTHOPAEDIC SURGERY

## 2024-12-03 DEVICE — IMPLANTABLE DEVICE: Type: IMPLANTABLE DEVICE | Site: KNEE | Status: FUNCTIONAL

## 2024-12-03 DEVICE — PIN FIXATION STRAIGHT L140 MM OD4 MM KNEE STERILE 144140: Type: IMPLANTABLE DEVICE | Site: KNEE | Status: FUNCTIONAL

## 2024-12-03 DEVICE — PIN FIXATION L110 MM OD4 MM BONE STERILE 144110: Type: IMPLANTABLE DEVICE | Site: KNEE | Status: FUNCTIONAL

## 2024-12-03 DEVICE — IMP BONE CEMENT SIMPLEX W/GENTAMICIN 6195-1-001: Type: IMPLANTABLE DEVICE | Site: KNEE | Status: FUNCTIONAL

## 2024-12-03 RX ORDER — OXYCODONE HYDROCHLORIDE 5 MG/1
5-10 TABLET ORAL EVERY 4 HOURS PRN
Qty: 30 TABLET | Refills: 0 | Status: SHIPPED | OUTPATIENT
Start: 2024-12-03

## 2024-12-03 RX ORDER — CEFAZOLIN SODIUM/WATER 2 G/20 ML
2 SYRINGE (ML) INTRAVENOUS SEE ADMIN INSTRUCTIONS
Status: DISCONTINUED | OUTPATIENT
Start: 2024-12-03 | End: 2024-12-03

## 2024-12-03 RX ORDER — METHOCARBAMOL 750 MG/1
750 TABLET, FILM COATED ORAL EVERY 6 HOURS PRN
Status: DISCONTINUED | OUTPATIENT
Start: 2024-12-03 | End: 2024-12-04 | Stop reason: HOSPADM

## 2024-12-03 RX ORDER — BISACODYL 10 MG
10 SUPPOSITORY, RECTAL RECTAL DAILY PRN
Status: DISCONTINUED | OUTPATIENT
Start: 2024-12-06 | End: 2024-12-04 | Stop reason: HOSPADM

## 2024-12-03 RX ORDER — TRANEXAMIC ACID 650 MG/1
1950 TABLET ORAL DAILY
Status: COMPLETED | OUTPATIENT
Start: 2024-12-03 | End: 2024-12-03

## 2024-12-03 RX ORDER — DEXAMETHASONE SODIUM PHOSPHATE 10 MG/ML
4 INJECTION, SOLUTION INTRAMUSCULAR; INTRAVENOUS
Status: DISCONTINUED | OUTPATIENT
Start: 2024-12-03 | End: 2024-12-03

## 2024-12-03 RX ORDER — FENTANYL CITRATE 50 UG/ML
50 INJECTION, SOLUTION INTRAMUSCULAR; INTRAVENOUS
Status: DISCONTINUED | OUTPATIENT
Start: 2024-12-03 | End: 2024-12-03

## 2024-12-03 RX ORDER — METOPROLOL TARTRATE 1 MG/ML
1-2 INJECTION, SOLUTION INTRAVENOUS EVERY 5 MIN PRN
Status: DISCONTINUED | OUTPATIENT
Start: 2024-12-03 | End: 2024-12-03 | Stop reason: HOSPADM

## 2024-12-03 RX ORDER — SODIUM CHLORIDE, SODIUM LACTATE, POTASSIUM CHLORIDE, CALCIUM CHLORIDE 600; 310; 30; 20 MG/100ML; MG/100ML; MG/100ML; MG/100ML
INJECTION, SOLUTION INTRAVENOUS CONTINUOUS
Status: DISCONTINUED | OUTPATIENT
Start: 2024-12-03 | End: 2024-12-03

## 2024-12-03 RX ORDER — LIDOCAINE 40 MG/G
CREAM TOPICAL
Status: DISCONTINUED | OUTPATIENT
Start: 2024-12-03 | End: 2024-12-04 | Stop reason: HOSPADM

## 2024-12-03 RX ORDER — SODIUM CHLORIDE, SODIUM LACTATE, POTASSIUM CHLORIDE, CALCIUM CHLORIDE 600; 310; 30; 20 MG/100ML; MG/100ML; MG/100ML; MG/100ML
INJECTION, SOLUTION INTRAVENOUS CONTINUOUS
Status: DISCONTINUED | OUTPATIENT
Start: 2024-12-03 | End: 2024-12-04

## 2024-12-03 RX ORDER — AMOXICILLIN 250 MG
1-2 CAPSULE ORAL 2 TIMES DAILY
Qty: 30 TABLET | Refills: 0 | Status: SHIPPED | OUTPATIENT
Start: 2024-12-03

## 2024-12-03 RX ORDER — FAMOTIDINE 20 MG/1
20 TABLET, FILM COATED ORAL 2 TIMES DAILY
Status: DISCONTINUED | OUTPATIENT
Start: 2024-12-03 | End: 2024-12-04 | Stop reason: HOSPADM

## 2024-12-03 RX ORDER — ONDANSETRON 2 MG/ML
4 INJECTION INTRAMUSCULAR; INTRAVENOUS EVERY 30 MIN PRN
Status: DISCONTINUED | OUTPATIENT
Start: 2024-12-03 | End: 2024-12-03 | Stop reason: HOSPADM

## 2024-12-03 RX ORDER — NALOXONE HYDROCHLORIDE 0.4 MG/ML
0.4 INJECTION, SOLUTION INTRAMUSCULAR; INTRAVENOUS; SUBCUTANEOUS
Status: DISCONTINUED | OUTPATIENT
Start: 2024-12-03 | End: 2024-12-04 | Stop reason: HOSPADM

## 2024-12-03 RX ORDER — PROPOFOL 10 MG/ML
INJECTION, EMULSION INTRAVENOUS PRN
Status: DISCONTINUED | OUTPATIENT
Start: 2024-12-03 | End: 2024-12-03

## 2024-12-03 RX ORDER — HYDROXYZINE HYDROCHLORIDE 25 MG/1
25 TABLET, FILM COATED ORAL EVERY 6 HOURS PRN
Status: DISCONTINUED | OUTPATIENT
Start: 2024-12-03 | End: 2024-12-03 | Stop reason: HOSPADM

## 2024-12-03 RX ORDER — NALOXONE HYDROCHLORIDE 0.4 MG/ML
0.1 INJECTION, SOLUTION INTRAMUSCULAR; INTRAVENOUS; SUBCUTANEOUS
Status: DISCONTINUED | OUTPATIENT
Start: 2024-12-03 | End: 2024-12-03

## 2024-12-03 RX ORDER — OXYCODONE HYDROCHLORIDE 5 MG/1
5 TABLET ORAL EVERY 4 HOURS PRN
Status: DISCONTINUED | OUTPATIENT
Start: 2024-12-03 | End: 2024-12-04 | Stop reason: HOSPADM

## 2024-12-03 RX ORDER — HYDROXYZINE HYDROCHLORIDE 25 MG/1
25 TABLET, FILM COATED ORAL EVERY 6 HOURS PRN
Status: DISCONTINUED | OUTPATIENT
Start: 2024-12-03 | End: 2024-12-04 | Stop reason: HOSPADM

## 2024-12-03 RX ORDER — OXYCODONE HYDROCHLORIDE 5 MG/1
10 TABLET ORAL EVERY 4 HOURS PRN
Status: DISCONTINUED | OUTPATIENT
Start: 2024-12-03 | End: 2024-12-04 | Stop reason: HOSPADM

## 2024-12-03 RX ORDER — FENTANYL CITRATE 50 UG/ML
50 INJECTION, SOLUTION INTRAMUSCULAR; INTRAVENOUS EVERY 5 MIN PRN
Status: DISCONTINUED | OUTPATIENT
Start: 2024-12-03 | End: 2024-12-03 | Stop reason: HOSPADM

## 2024-12-03 RX ORDER — TRANEXAMIC ACID 10 MG/ML
1 INJECTION, SOLUTION INTRAVENOUS ONCE
Status: DISCONTINUED | OUTPATIENT
Start: 2024-12-03 | End: 2024-12-03

## 2024-12-03 RX ORDER — ONDANSETRON 2 MG/ML
INJECTION INTRAMUSCULAR; INTRAVENOUS PRN
Status: DISCONTINUED | OUTPATIENT
Start: 2024-12-03 | End: 2024-12-03

## 2024-12-03 RX ORDER — ACETAMINOPHEN 325 MG/1
650 TABLET ORAL EVERY 4 HOURS PRN
Qty: 100 TABLET | Refills: 0 | Status: SHIPPED | OUTPATIENT
Start: 2024-12-03

## 2024-12-03 RX ORDER — HYDROMORPHONE HCL IN WATER/PF 6 MG/30 ML
0.4 PATIENT CONTROLLED ANALGESIA SYRINGE INTRAVENOUS
Status: DISCONTINUED | OUTPATIENT
Start: 2024-12-03 | End: 2024-12-04 | Stop reason: HOSPADM

## 2024-12-03 RX ORDER — ACETAMINOPHEN 325 MG/1
975 TABLET ORAL EVERY 8 HOURS
Status: DISCONTINUED | OUTPATIENT
Start: 2024-12-03 | End: 2024-12-04 | Stop reason: HOSPADM

## 2024-12-03 RX ORDER — LIDOCAINE HYDROCHLORIDE 20 MG/ML
INJECTION, SOLUTION INFILTRATION; PERINEURAL PRN
Status: DISCONTINUED | OUTPATIENT
Start: 2024-12-03 | End: 2024-12-03

## 2024-12-03 RX ORDER — FENTANYL CITRATE 50 UG/ML
25 INJECTION, SOLUTION INTRAMUSCULAR; INTRAVENOUS EVERY 5 MIN PRN
Status: DISCONTINUED | OUTPATIENT
Start: 2024-12-03 | End: 2024-12-03 | Stop reason: HOSPADM

## 2024-12-03 RX ORDER — HYDROMORPHONE HYDROCHLORIDE 1 MG/ML
0.5 INJECTION, SOLUTION INTRAMUSCULAR; INTRAVENOUS; SUBCUTANEOUS EVERY 5 MIN PRN
Status: DISCONTINUED | OUTPATIENT
Start: 2024-12-03 | End: 2024-12-03 | Stop reason: HOSPADM

## 2024-12-03 RX ORDER — ASPIRIN 325 MG
325 TABLET, DELAYED RELEASE (ENTERIC COATED) ORAL 2 TIMES DAILY
Status: DISCONTINUED | OUTPATIENT
Start: 2024-12-03 | End: 2024-12-04 | Stop reason: HOSPADM

## 2024-12-03 RX ORDER — HYDRALAZINE HYDROCHLORIDE 20 MG/ML
2.5-5 INJECTION INTRAMUSCULAR; INTRAVENOUS EVERY 10 MIN PRN
Status: DISCONTINUED | OUTPATIENT
Start: 2024-12-03 | End: 2024-12-03 | Stop reason: HOSPADM

## 2024-12-03 RX ORDER — FENTANYL CITRATE-0.9 % NACL/PF 10 MCG/ML
PLASTIC BAG, INJECTION (ML) INTRAVENOUS CONTINUOUS PRN
Status: DISCONTINUED | OUTPATIENT
Start: 2024-12-03 | End: 2024-12-03

## 2024-12-03 RX ORDER — NALOXONE HYDROCHLORIDE 0.4 MG/ML
0.2 INJECTION, SOLUTION INTRAMUSCULAR; INTRAVENOUS; SUBCUTANEOUS
Status: DISCONTINUED | OUTPATIENT
Start: 2024-12-03 | End: 2024-12-04 | Stop reason: HOSPADM

## 2024-12-03 RX ORDER — FENTANYL CITRATE 50 UG/ML
INJECTION, SOLUTION INTRAMUSCULAR; INTRAVENOUS PRN
Status: DISCONTINUED | OUTPATIENT
Start: 2024-12-03 | End: 2024-12-03

## 2024-12-03 RX ORDER — AMOXICILLIN 250 MG
1 CAPSULE ORAL 2 TIMES DAILY
Status: DISCONTINUED | OUTPATIENT
Start: 2024-12-03 | End: 2024-12-04 | Stop reason: HOSPADM

## 2024-12-03 RX ORDER — PROCHLORPERAZINE MALEATE 5 MG/1
10 TABLET ORAL EVERY 6 HOURS PRN
Status: DISCONTINUED | OUTPATIENT
Start: 2024-12-03 | End: 2024-12-04 | Stop reason: HOSPADM

## 2024-12-03 RX ORDER — SODIUM CHLORIDE, SODIUM LACTATE, POTASSIUM CHLORIDE, CALCIUM CHLORIDE 600; 310; 30; 20 MG/100ML; MG/100ML; MG/100ML; MG/100ML
INJECTION, SOLUTION INTRAVENOUS CONTINUOUS PRN
Status: DISCONTINUED | OUTPATIENT
Start: 2024-12-03 | End: 2024-12-03

## 2024-12-03 RX ORDER — HYDROXYZINE HYDROCHLORIDE 25 MG/1
25 TABLET, FILM COATED ORAL EVERY 6 HOURS PRN
Qty: 30 TABLET | Refills: 0 | Status: SHIPPED | OUTPATIENT
Start: 2024-12-03

## 2024-12-03 RX ORDER — CEFAZOLIN SODIUM/WATER 2 G/20 ML
2 SYRINGE (ML) INTRAVENOUS EVERY 8 HOURS
Status: COMPLETED | OUTPATIENT
Start: 2024-12-03 | End: 2024-12-04

## 2024-12-03 RX ORDER — ACETAMINOPHEN 325 MG/1
650 TABLET ORAL EVERY 4 HOURS PRN
Status: DISCONTINUED | OUTPATIENT
Start: 2024-12-06 | End: 2024-12-04 | Stop reason: HOSPADM

## 2024-12-03 RX ORDER — ONDANSETRON 4 MG/1
4 TABLET, ORALLY DISINTEGRATING ORAL EVERY 30 MIN PRN
Status: DISCONTINUED | OUTPATIENT
Start: 2024-12-03 | End: 2024-12-03

## 2024-12-03 RX ORDER — POLYETHYLENE GLYCOL 3350 17 G/17G
17 POWDER, FOR SOLUTION ORAL DAILY
Status: DISCONTINUED | OUTPATIENT
Start: 2024-12-04 | End: 2024-12-04 | Stop reason: HOSPADM

## 2024-12-03 RX ORDER — TRANEXAMIC ACID 10 MG/ML
1 INJECTION, SOLUTION INTRAVENOUS ONCE
Status: COMPLETED | OUTPATIENT
Start: 2024-12-03 | End: 2024-12-03

## 2024-12-03 RX ORDER — HYDROMORPHONE HCL IN WATER/PF 6 MG/30 ML
0.2 PATIENT CONTROLLED ANALGESIA SYRINGE INTRAVENOUS
Status: DISCONTINUED | OUTPATIENT
Start: 2024-12-03 | End: 2024-12-04 | Stop reason: HOSPADM

## 2024-12-03 RX ORDER — HYDROMORPHONE HYDROCHLORIDE 1 MG/ML
0.2 INJECTION, SOLUTION INTRAMUSCULAR; INTRAVENOUS; SUBCUTANEOUS EVERY 5 MIN PRN
Status: DISCONTINUED | OUTPATIENT
Start: 2024-12-03 | End: 2024-12-03 | Stop reason: HOSPADM

## 2024-12-03 RX ORDER — ASPIRIN 325 MG
325 TABLET, DELAYED RELEASE (ENTERIC COATED) ORAL 2 TIMES DAILY
Qty: 84 TABLET | Refills: 0 | Status: SHIPPED | OUTPATIENT
Start: 2024-12-03 | End: 2025-01-14

## 2024-12-03 RX ORDER — ONDANSETRON 4 MG/1
4 TABLET, ORALLY DISINTEGRATING ORAL EVERY 30 MIN PRN
Status: DISCONTINUED | OUTPATIENT
Start: 2024-12-03 | End: 2024-12-03 | Stop reason: HOSPADM

## 2024-12-03 RX ORDER — NALOXONE HYDROCHLORIDE 0.4 MG/ML
0.1 INJECTION, SOLUTION INTRAMUSCULAR; INTRAVENOUS; SUBCUTANEOUS
Status: DISCONTINUED | OUTPATIENT
Start: 2024-12-03 | End: 2024-12-03 | Stop reason: HOSPADM

## 2024-12-03 RX ORDER — SODIUM CHLORIDE, SODIUM LACTATE, POTASSIUM CHLORIDE, CALCIUM CHLORIDE 600; 310; 30; 20 MG/100ML; MG/100ML; MG/100ML; MG/100ML
INJECTION, SOLUTION INTRAVENOUS CONTINUOUS
Status: DISCONTINUED | OUTPATIENT
Start: 2024-12-03 | End: 2024-12-03 | Stop reason: HOSPADM

## 2024-12-03 RX ORDER — CEFAZOLIN SODIUM/WATER 2 G/20 ML
2 SYRINGE (ML) INTRAVENOUS
Status: COMPLETED | OUTPATIENT
Start: 2024-12-03 | End: 2024-12-03

## 2024-12-03 RX ORDER — ONDANSETRON 4 MG/1
4 TABLET, ORALLY DISINTEGRATING ORAL EVERY 6 HOURS PRN
Status: DISCONTINUED | OUTPATIENT
Start: 2024-12-03 | End: 2024-12-04 | Stop reason: HOSPADM

## 2024-12-03 RX ORDER — ONDANSETRON 2 MG/ML
4 INJECTION INTRAMUSCULAR; INTRAVENOUS EVERY 6 HOURS PRN
Status: DISCONTINUED | OUTPATIENT
Start: 2024-12-03 | End: 2024-12-04 | Stop reason: HOSPADM

## 2024-12-03 RX ORDER — ONDANSETRON 2 MG/ML
4 INJECTION INTRAMUSCULAR; INTRAVENOUS EVERY 30 MIN PRN
Status: DISCONTINUED | OUTPATIENT
Start: 2024-12-03 | End: 2024-12-03

## 2024-12-03 RX ORDER — DEXAMETHASONE SODIUM PHOSPHATE 10 MG/ML
4 INJECTION, SOLUTION INTRAMUSCULAR; INTRAVENOUS
Status: DISCONTINUED | OUTPATIENT
Start: 2024-12-03 | End: 2024-12-03 | Stop reason: HOSPADM

## 2024-12-03 RX ORDER — PROPOFOL 10 MG/ML
INJECTION, EMULSION INTRAVENOUS CONTINUOUS PRN
Status: DISCONTINUED | OUTPATIENT
Start: 2024-12-03 | End: 2024-12-03

## 2024-12-03 RX ORDER — MEPERIDINE HYDROCHLORIDE 25 MG/ML
12.5 INJECTION INTRAMUSCULAR; INTRAVENOUS; SUBCUTANEOUS EVERY 5 MIN PRN
Status: DISCONTINUED | OUTPATIENT
Start: 2024-12-03 | End: 2024-12-03 | Stop reason: HOSPADM

## 2024-12-03 RX ADMIN — Medication 10 MG: at 23:23

## 2024-12-03 RX ADMIN — SENNOSIDES AND DOCUSATE SODIUM 1 TABLET: 8.6; 5 TABLET ORAL at 20:19

## 2024-12-03 RX ADMIN — ONDANSETRON 4 MG: 2 INJECTION INTRAMUSCULAR; INTRAVENOUS at 11:53

## 2024-12-03 RX ADMIN — SODIUM CHLORIDE, POTASSIUM CHLORIDE, SODIUM LACTATE AND CALCIUM CHLORIDE: 600; 310; 30; 20 INJECTION, SOLUTION INTRAVENOUS at 11:54

## 2024-12-03 RX ADMIN — SODIUM CHLORIDE, POTASSIUM CHLORIDE, SODIUM LACTATE AND CALCIUM CHLORIDE: 600; 310; 30; 20 INJECTION, SOLUTION INTRAVENOUS at 10:45

## 2024-12-03 RX ADMIN — METHOCARBAMOL 750 MG: 750 TABLET, FILM COATED ORAL at 19:03

## 2024-12-03 RX ADMIN — OXYCODONE HYDROCHLORIDE 10 MG: 5 TABLET ORAL at 22:09

## 2024-12-03 RX ADMIN — MIDAZOLAM 2 MG: 1 INJECTION INTRAMUSCULAR; INTRAVENOUS at 11:52

## 2024-12-03 RX ADMIN — FENTANYL CITRATE 50 MCG: 50 INJECTION INTRAMUSCULAR; INTRAVENOUS at 11:52

## 2024-12-03 RX ADMIN — SODIUM CHLORIDE, POTASSIUM CHLORIDE, SODIUM LACTATE AND CALCIUM CHLORIDE: 600; 310; 30; 20 INJECTION, SOLUTION INTRAVENOUS at 23:23

## 2024-12-03 RX ADMIN — Medication 30 MCG/MIN: at 12:08

## 2024-12-03 RX ADMIN — SODIUM CHLORIDE, POTASSIUM CHLORIDE, SODIUM LACTATE AND CALCIUM CHLORIDE: 600; 310; 30; 20 INJECTION, SOLUTION INTRAVENOUS at 16:29

## 2024-12-03 RX ADMIN — PROPOFOL 25 MCG/KG/MIN: 10 INJECTION, EMULSION INTRAVENOUS at 11:54

## 2024-12-03 RX ADMIN — LIDOCAINE HYDROCHLORIDE 50 MG: 20 INJECTION, SOLUTION INFILTRATION; PERINEURAL at 11:54

## 2024-12-03 RX ADMIN — FENTANYL CITRATE 50 MCG: 50 INJECTION, SOLUTION INTRAMUSCULAR; INTRAVENOUS at 15:02

## 2024-12-03 RX ADMIN — FAMOTIDINE 20 MG: 20 TABLET, FILM COATED ORAL at 20:19

## 2024-12-03 RX ADMIN — TRANEXAMIC ACID 1950 MG: 650 TABLET ORAL at 10:23

## 2024-12-03 RX ADMIN — ACETAMINOPHEN 975 MG: 325 TABLET, FILM COATED ORAL at 20:19

## 2024-12-03 RX ADMIN — ASPIRIN 325 MG: 325 TABLET, COATED ORAL at 20:19

## 2024-12-03 RX ADMIN — FENTANYL CITRATE 50 MCG: 50 INJECTION INTRAMUSCULAR; INTRAVENOUS at 11:57

## 2024-12-03 RX ADMIN — OXYCODONE HYDROCHLORIDE 5 MG: 5 TABLET ORAL at 17:50

## 2024-12-03 RX ADMIN — Medication 2 G: at 19:00

## 2024-12-03 RX ADMIN — PROPOFOL 50 MG: 10 INJECTION, EMULSION INTRAVENOUS at 13:11

## 2024-12-03 RX ADMIN — Medication 2 G: at 11:54

## 2024-12-03 ASSESSMENT — ACTIVITIES OF DAILY LIVING (ADL)
ADLS_ACUITY_SCORE: 41
ADLS_ACUITY_SCORE: 41
CONCENTRATING,_REMEMBERING_OR_MAKING_DECISIONS_DIFFICULTY: NO
ADLS_ACUITY_SCORE: 41
WALKING_OR_CLIMBING_STAIRS_DIFFICULTY: NO
ADLS_ACUITY_SCORE: 19
ADLS_ACUITY_SCORE: 41
TOILETING_ISSUES: NO
ADLS_ACUITY_SCORE: 41
DIFFICULTY_COMMUNICATING: NO
HEARING_DIFFICULTY_OR_DEAF: NO
DIFFICULTY_EATING/SWALLOWING: NO
CHANGE_IN_FUNCTIONAL_STATUS_SINCE_ONSET_OF_CURRENT_ILLNESS/INJURY: NO
ADLS_ACUITY_SCORE: 41
DRESSING/BATHING_DIFFICULTY: NO
ADLS_ACUITY_SCORE: 15
DOING_ERRANDS_INDEPENDENTLY_DIFFICULTY: NO
ADLS_ACUITY_SCORE: 19
ADLS_ACUITY_SCORE: 41
WEAR_GLASSES_OR_BLIND: NO
ADLS_ACUITY_SCORE: 19
ADLS_ACUITY_SCORE: 41
FALL_HISTORY_WITHIN_LAST_SIX_MONTHS: NO
ADLS_ACUITY_SCORE: 41

## 2024-12-03 NOTE — ANESTHESIA PROCEDURE NOTES
"Intrathecal Procedure Note    Pre-Procedure   Staff -        Resident/Fellow: Kavya Louie       CRNA: Sri Ospina APRN CRNA       Performed By: CRNA and SRNA       Location: OR       Pre-Anesthestic Checklist: patient identified, IV checked, risks and benefits discussed, informed consent, monitors and equipment checked and pre-op evaluation  Timeout:       Correct Patient: Yes        Correct Procedure: Yes        Correct Site: Yes        Correct Position: Yes   Procedure Documentation  Procedure: intrathecal       Patient Position: sitting       Patient Prep/Sterile Barriers: sterile gloves, mask, patient draped       Skin prep: Betadine       Insertion Site: L3-4. (midline approach).       Needle Gauge: 25.        Needle Length (Inches): 3.5        Spinal Needle Type: Pencan       Introducer used       Introducer: 20 G       # of attempts: 1 and  # of redirects:  0    Assessment/Narrative         Paresthesias: No.       CSF fluid: clear.       Opening pressure was cmH2O while  Sitting.       Comments:  Placed per Nuzhat SRNA under the direct supervision of Sri Ospina CRNA as per the note above without difficulty       FOR [facility] ([facility] ONLY:   Pain Team Contact information: please page the Pain Team Via [employer]. Search \"Pain\". During daytime hours, please page the attending first. At night please page the resident first.      "

## 2024-12-03 NOTE — OR NURSING
Transfer from  PACU to Room 271  Transferred to bed via glyder mat    S: 40 y/o Male  S/P right partial knee replacement       Anesthesia Type:  spinal       Surgeon:  Dr. Marie       Allergies:  See Medication Reconciliation Record       DNR: no     B:  Pertinent Medical History: See EHR           Surgical History:  knee repairs    A:  EBL: 10        IVF:  1100        UOP:  0 - bladder scanned for 330        NPO:  ___Yes _x__No         Vomiting:  ___Yes __x_No         Drainage: none        Skin Integrity: Incision WNL       RFO: ___Yes_x__No        SSI Patient?  ___Yes___No         Brace/sling/equipment:  ___Yes__x_No          See PACU record for ongoing assessment, vital signs and pain assessment.    R: Post-Op vitals and assessments as ordered/indicated per patient's condition.       Follow Post-Op orders and notify Physician prn.       Continue to involve patient/family in plan of care and discharge planning.       Reinforce Pre-Operative education.       Implement skin safety interventions as appropriate.    Name: Report given to KEN Hopper/DEANDRE ARRINGTON

## 2024-12-03 NOTE — INTERVAL H&P NOTE
This H&P has been reviewed and there are no clinically significant changes in the patient s condition.  The patient was evaluated by myself as well as Dr. Ansari prior to surgery. The Patient is approved for the surgery and the stated surgical procedure is still clinically indicated.

## 2024-12-03 NOTE — LETTER
December 4, 2024      Kenn Dobson  [address]  [place] [address]        To Whom It May Concern,     Kenn Dobson underwent surgery on 12/3/24.  Please accommodate for an extension for his school work/assignments.           NICHOLAS Marinelli, CNP  Orthopedic Surgery

## 2024-12-03 NOTE — PROCEDURES
[facility] Operative Report  Pre-operative diagnosis: Right knee primary osteoarthritis    Post-operative diagnosis: same    Procedure: Right knee Torrey assisted partial medial uni- compartment arthroplasty  Surgeon:  Zac Marie DO  Anesthesia: spinal  Assistant(s): NICHOLAS Bhakta, CNP (A advanced practice provider was necessary for his expertise, exposure and surgical assistance throughout the case.)  Estimated blood loss: Less than 10ml  Tourniquet time/pressure: 79 minutes at 250 mmHg  Urine output: na  Fluids: 800 ml Crystalloids  Specimens: None  Counts: Correct  Complications:  None  Implants:    [place] Torrey Restoris MCK size 7 femoral component, restoris MCK onlay tibial baseplate size 6, 8 mm insert    Findings:   Bone-on-bone medial compartment arthritis.  Rimming osteophytes.  Lateral compartment patellofemoral preserved.  ACL intact.  Preop measurements showed a 5.5 degree flexion contracture with a degree of varus.  Final measurements shows 2.5 degrees flexion contracture with 4 degrees of varus.         Note:  Kenn Dobson is a  39 year old year old male that presented with longstanding right knee pain.  Kenn has right knee-bone-on-bone medial compartment arthritis which is consistent with his clinical exam. Severe pain limiting his activities. He is attempted rest, activity modification, intra-articular steroid injections, Tylenol and ibuprofen as well as aggressive exercise with no improvement. Pain impacts his quality of life. He finds himself decreasing his activities. He is limping. Given his pain that is refractory to conservative care impacting her quality of life with a clinical exam consistent with his radiographs we discussed proceeding with right knee partial replacement medial unicompartmental.     Kenn Dobson was seen preoperatively.  Informed consent was verified, the extremity deb, the consent was signed.  The patient was transferred back to the OR suite  at [facility].  Transferred to the table with no issues.  When deemed appropriate the patient was prepped and draped in standard fashion.  A timeout was performed the appropriate patient, surgery, extremity, procedure was verified.  Antibiotics have been administered.    An Esmarch was utilized to exsanguinate the extremity tourniquet inflated in his upper thigh.    A longitudinal midline incision through the skin was performed.  Subsequent medial arthrotomy was performed.  Part of the anterior fat pad excised.  A checkpoint was placed on the femur and tibia.  This was followed with extra-articular placement of the pins for the arrays.    Hip center was obtained.  The knee was then registered using multiple points.  Osteophytes removed.    Multiple tension points from 10 degrees to near full flexion was obtained using a valgus stress.  A graph was then obtained utilizing the [employer] system.  With this complete the component was adjusted to allow for approximately 1 mm of laxity throughout and equal the range of motion.  With this confirmed to be in excepted position showing a balanced medial compartment and knee the plan was initiated.  I utilized the planar workflow allowing for the saw to be utilized on the tibia and posterior femur.  This was followed with the bur.  This was completed with no issues protecting the collateral and posterior neurovascular structures.    Trial components were placed.  The knee had full range of motion with balance throughout the arc of motion.    Final components were placed and cemented in position and held in approximate 20 degrees of flexion to the cement cured.  During this I did dilute Betadine lavage was performed followed the pulse lavage.  Excess bone cement fragments were removed.      Closure:  After irrigation of the wound, the subcuticular layer was closed with Vicryl in interrupted fashion.  The skin was closed with a running Monocryl suture and skin glue.  A  sterile bandage was applied.      The patient was then transferred to the PACU in stable condition.  He will receive physical therapy, pain control.        Maicol Marie D.O.

## 2024-12-03 NOTE — ANESTHESIA CARE TRANSFER NOTE
Patient: Kenn Dobson    Procedure: Procedure(s):  right knee shahid assisted partial medial knee replacement       Diagnosis: Primary osteoarthritis of right knee [M17.11]  Diagnosis Additional Information: No value filed.    Anesthesia Type:   Spinal     Note:    Oropharynx: oropharynx clear of all foreign objects and spontaneously breathing  Level of Consciousness: drowsy  Oxygen Supplementation: face mask    Independent Airway: airway patency satisfactory and stable  Dentition: dentition unchanged  Vital Signs Stable: post-procedure vital signs reviewed and stable  Report to RN Given: handoff report given  Patient transferred to: PACU    Handoff Report: Identifed the Patient, Identified the Reponsible Provider, Reviewed the pertinent medical history, Discussed the surgical course, Reviewed Intra-OP anesthesia mangement and issues during anesthesia, Set expectations for post-procedure period and Allowed opportunity for questions and acknowledgement of understanding  Vitals:  Vitals Value Taken Time   BP     Temp     Pulse     Resp     SpO2         Electronically Signed By: NICHOLAS Story CRNA  December 3, 2024  2:34 PM

## 2024-12-03 NOTE — ANESTHESIA PREPROCEDURE EVALUATION
Anesthesia Pre-Procedure Evaluation    Patient: Kenn Dobson   MRN: [id number] : 1985        Procedure : Procedure(s):  right knee shahid assisted partial knee replacement          Past Medical History:   Diagnosis Date     Anxiety      Bipolar affective (H)      Emotional disorder (H)      Schizoaffective disorder (H)       No past surgical history on file.   Allergies   Allergen Reactions     Dogs       Social History     Tobacco Use     Smoking status: Never     Smokeless tobacco: Never   Substance Use Topics     Alcohol use: Not Currently     Comment: Drinks daily      Wt Readings from Last 1 Encounters:   24 86.1 kg (189 lb 12.8 oz)        Anesthesia Evaluation   Pt has not had prior anesthetic         ROS/MED HX  ENT/Pulmonary:  - neg pulmonary ROS     Neurologic:  - neg neurologic ROS     Cardiovascular:  - neg cardiovascular ROS     METS/Exercise Tolerance: >4 METS    Hematologic:  - neg hematologic  ROS     Musculoskeletal: Comment: Multiple joint pain  (+)  arthritis,             GI/Hepatic:  - neg GI/hepatic ROS  (-) GERD   Renal/Genitourinary:  - neg Renal ROS     Endo:     (+)               Obesity,       Psychiatric/Substance Use:     (+) psychiatric history bipolar, anxiety and other (comment) (Schizoaffective disorder.)       Infectious Disease:  - neg infectious disease ROS     Malignancy:  - neg malignancy ROS     Other:  - neg other ROS          Physical Exam    Airway  airway exam normal      Mallampati: II   TM distance: > 3 FB   Neck ROM: full   Mouth opening: > 3 cm    Respiratory Devices and Support         Dental       (+) Completely normal teeth      Cardiovascular   cardiovascular exam normal       Rhythm and rate: regular and normal     Pulmonary   pulmonary exam normal        breath sounds clear to auscultation         OUTSIDE LABS:  CBC:   Lab Results   Component Value Date    WBC 6.3 11/15/2023    WBC 6.1 2017    HGB 17.1 2024    HGB 16.3 11/15/2023     "HCT 47.2 11/15/2023    HCT 47.1 12/18/2017     11/15/2023     12/18/2017     BMP:   Lab Results   Component Value Date     11/29/2024     11/21/2024    POTASSIUM 4.5 11/29/2024    POTASSIUM 3.9 11/21/2024    CHLORIDE 104 11/29/2024    CHLORIDE 104 11/21/2024    CO2 28 11/29/2024    CO2 26 11/21/2024    BUN 17.9 11/29/2024    BUN 18.8 11/21/2024    CR 1.23 (H) 11/29/2024    CR 1.45 (H) 11/21/2024    GLC 96 11/29/2024    GLC 98 11/21/2024     COAGS: No results found for: \"PTT\", \"INR\", \"FIBR\"  POC: No results found for: \"BGM\", \"HCG\", \"HCGS\"  HEPATIC:   Lab Results   Component Value Date    ALBUMIN 4.7 11/15/2023    PROTTOTAL 7.8 11/15/2023    ALT 16 11/15/2023    AST 18 11/15/2023    ALKPHOS 51 11/15/2023    BILITOTAL 0.5 11/15/2023     OTHER:   Lab Results   Component Value Date    HOLLIE 9.0 11/29/2024    LIPASE 22 11/15/2023    TSH 1.36 12/18/2017       Anesthesia Plan    ASA Status:  2    NPO Status:  NPO Appropriate    Anesthesia Type: Spinal.   Induction: Intravenous, Propofol.   Maintenance: TIVA.        Consents    Anesthesia Plan(s) and associated risks, benefits, and realistic alternatives discussed. Questions answered and patient/representative(s) expressed understanding.     - Discussed: Risks, Benefits and Alternatives for BOTH SEDATION and the PROCEDURE were discussed     - Discussed with:  Patient      - Extended Intubation/Ventilatory Support Discussed: No.      - Patient is DNR/DNI Status: No     Use of blood products discussed: No .     Postoperative Care    Pain management: IV analgesics, Oral pain medications, Neuraxial analgesia, Peripheral nerve block (Single Shot), Multi-modal analgesia.     - Plan for long acting post-op opioid use   PONV prophylaxis: Ondansetron (or other 5HT-3), Meclizine or Dimenhydrinate, Background Propofol Infusion     Comments:    Other Comments: The risks and benefits of anesthesia, and the alternatives where applicable, have been discussed with " "the patient, and they wish to proceed.    Will place a post-operative adductor canal block on the right           Kavyaedmund Louie    I have reviewed the pertinent notes and labs in the chart from the past 30 days and (re)examined the patient.  Any updates or changes from those notes are reflected in this note.                         # Obesity: Estimated body mass index is 30.5 kg/m  as calculated from the following:    Height as of 11/21/24: 1.68 m (5' 6.14\").    Weight as of 11/21/24: 86.1 kg (189 lb 12.8 oz).             "

## 2024-12-03 NOTE — PROGRESS NOTES
S-(situation): Patient arrives to room 271 via cart from PACU    B-(background): partial right knee replacement    A-(assessment): vss, pain controlled, RA     R-(recommendations): Orders reviewed with patient. Will monitor patient per MD orders.     Inpatient nursing criteria listed below were met:    Health care directives status obtained and documented: Yes  VTE ordered/documented: Yes  Skin issues/needs documented:Yes  Isolation addressed and Signage used: NA  Fall Prevention: Care plan updated NA Education given and documented Yes  Care Plan initiated and Co-Morbidities added:  outpatient  Education Assessment documented:Yes  Admission Education Documented: Yes  If present CAUTI/CLABI Education done: Yes  New medication patient education completed and documented (Possible Side Effects of Common Medications handout): Yes  Allergies Reviewed: Yes  Admission Medication Reconciliation completed: Yes  Home medications if not able to send immediately home with family stored here: NA  Reminder note placed in discharge instructions regarding home meds: NA  Individualized care needs/preferences addressed and charted: Yes  Provider Notified that patient has arrived to the unit: Yes

## 2024-12-04 ENCOUNTER — TELEPHONE (OUTPATIENT)
Dept: ORTHOPEDICS | Facility: CLINIC | Age: 39
End: 2024-12-04

## 2024-12-04 ENCOUNTER — MYC MEDICAL ADVICE (OUTPATIENT)
Dept: ORTHOPEDICS | Facility: CLINIC | Age: 39
End: 2024-12-04

## 2024-12-04 VITALS
SYSTOLIC BLOOD PRESSURE: 134 MMHG | TEMPERATURE: 98 F | BODY MASS INDEX: 30.54 KG/M2 | DIASTOLIC BLOOD PRESSURE: 88 MMHG | WEIGHT: 190 LBS | OXYGEN SATURATION: 96 % | RESPIRATION RATE: 18 BRPM | HEART RATE: 74 BPM

## 2024-12-04 LAB
FASTING STATUS PATIENT QL REPORTED: ABNORMAL
GLUCOSE SERPL-MCNC: 137 MG/DL (ref 70–99)
HGB BLD-MCNC: 15.3 G/DL (ref 13.3–17.7)

## 2024-12-04 PROCEDURE — 36415 COLL VENOUS BLD VENIPUNCTURE: CPT | Performed by: NURSE PRACTITIONER

## 2024-12-04 PROCEDURE — 250N000011 HC RX IP 250 OP 636: Performed by: NURSE PRACTITIONER

## 2024-12-04 PROCEDURE — 250N000013 HC RX MED GY IP 250 OP 250 PS 637: Performed by: NURSE PRACTITIONER

## 2024-12-04 PROCEDURE — 82947 ASSAY GLUCOSE BLOOD QUANT: CPT | Performed by: ORTHOPAEDIC SURGERY

## 2024-12-04 PROCEDURE — 85018 HEMOGLOBIN: CPT | Performed by: NURSE PRACTITIONER

## 2024-12-04 PROCEDURE — 97530 THERAPEUTIC ACTIVITIES: CPT | Mod: GP | Performed by: PHYSICAL THERAPIST

## 2024-12-04 RX ORDER — ONDANSETRON 4 MG/1
4 TABLET, ORALLY DISINTEGRATING ORAL EVERY 8 HOURS PRN
Qty: 12 TABLET | Refills: 1 | Status: SHIPPED | OUTPATIENT
Start: 2024-12-04

## 2024-12-04 RX ADMIN — PROCHLORPERAZINE MALEATE 10 MG: 5 TABLET ORAL at 08:15

## 2024-12-04 RX ADMIN — METHOCARBAMOL 750 MG: 750 TABLET, FILM COATED ORAL at 08:15

## 2024-12-04 RX ADMIN — ASPIRIN 325 MG: 325 TABLET, COATED ORAL at 08:16

## 2024-12-04 RX ADMIN — ONDANSETRON 4 MG: 4 TABLET, ORALLY DISINTEGRATING ORAL at 03:57

## 2024-12-04 RX ADMIN — OXYCODONE HYDROCHLORIDE 10 MG: 5 TABLET ORAL at 02:02

## 2024-12-04 RX ADMIN — Medication 2 G: at 03:52

## 2024-12-04 RX ADMIN — POLYETHYLENE GLYCOL 3350 17 G: 17 POWDER, FOR SOLUTION ORAL at 08:14

## 2024-12-04 RX ADMIN — FAMOTIDINE 20 MG: 20 TABLET, FILM COATED ORAL at 08:15

## 2024-12-04 RX ADMIN — METHOCARBAMOL 750 MG: 750 TABLET, FILM COATED ORAL at 02:02

## 2024-12-04 RX ADMIN — OXYCODONE HYDROCHLORIDE 10 MG: 5 TABLET ORAL at 08:14

## 2024-12-04 RX ADMIN — SENNOSIDES AND DOCUSATE SODIUM 1 TABLET: 8.6; 5 TABLET ORAL at 08:15

## 2024-12-04 ASSESSMENT — ACTIVITIES OF DAILY LIVING (ADL)
ADLS_ACUITY_SCORE: 18

## 2024-12-04 NOTE — PLAN OF CARE
Physical Therapy Discharge Summary    Reason for therapy discharge:    Discharged to home with outpatient therapy.  All goals and outcomes met, no further needs identified.    Progress towards therapy goal(s). See goals on Care Plan in [facility] electronic health record for goal details.  Goals met    Therapy recommendation(s):    Continued therapy is recommended.  Rationale/Recommendations:  outpatient PT .  Continue home exercise program.

## 2024-12-04 NOTE — PROGRESS NOTES
"PRIMARY DIAGNOSIS: \"GENERIC\" NURSING  OUTPATIENT/OBSERVATION GOALS TO BE MET BEFORE DISCHARGE:  ADLs back to baseline: Yes    Activity and level of assistance: Ambulating independently.    Pain status: Improved-controlled with oral pain medications.    Return to near baseline physical activity: Yes     A&O. VSS on rm air. Ambulating independently. Walking halls a few times overnight. Voiding adequately. PRN oxy and robaxin given for pain.      Please review provider order for any additional goals.   Nurse to notify provider when observation goals have been met and patient is ready for discharge.  "

## 2024-12-04 NOTE — TELEPHONE ENCOUNTER
Protocol printed and placed on Irene's desk. She will fax tomorrow.      NICHOLAS Marinelli, CNP  Orthopedic Surgery

## 2024-12-04 NOTE — PROGRESS NOTES
"PRIMARY DIAGNOSIS: \"GENERIC\" NURSING  OUTPATIENT/OBSERVATION GOALS TO BE MET BEFORE DISCHARGE:  ADLs back to baseline: Yes    Activity and level of assistance: Ambulating independently.    Pain status: Improved-controlled with oral pain medications.    Return to near baseline physical activity: Yes     A&O. VSS on rm air. Ambulating independently. Walking halls a few times overnight. Voiding adequately. PRN oxy and robaxin given for pain. Pt experiencing nausea from pain meds, PRN zofran given.     Please review provider order for any additional goals.   Nurse to notify provider when observation goals have been met and patient is ready for discharge.  "

## 2024-12-04 NOTE — ANESTHESIA POSTPROCEDURE EVALUATION
Patient: Kenn Dobson    Procedure: Procedure(s):  right knee shahid assisted partial medial knee replacement       Anesthesia Type:  Spinal    Note:  Disposition: Outpatient   Postop Pain Control: Uneventful            Sign Out: Well controlled pain   PONV: No   Neuro/Psych: Uneventful            Sign Out: Acceptable/Baseline neuro status   Airway/Respiratory: Uneventful            Sign Out: Acceptable/Baseline resp. status   CV/Hemodynamics: Uneventful            Sign Out: Acceptable CV status   Other NRE: NONE   DID A NON-ROUTINE EVENT OCCUR? No    Event details/Postop Comments:  Pt was happy with anesthesia care.  No complications.  I will follow up with the pt if needed.       Last vitals:  Vitals Value Taken Time   /78 12/03/24 1530   Temp 90.5  F (32.5  C) 12/03/24 1544   Pulse 76 12/03/24 1548   Resp 11 12/03/24 1548   SpO2 98 % 12/03/24 1549   Vitals shown include unfiled device data.    Electronically Signed By: NICHOLAS Story CRNA  December 4, 2024  2:27 PM

## 2024-12-04 NOTE — PROGRESS NOTES
12/03/24 1700   Appointment Info   Signing Clinician's Name / Credentials (PT) Gaetano Rouse, PT, DPT   Rehab Comments (PT) Patient amenable to skilled physical therapy services this afternoon.       Present no   Living Environment   People in Home child(amisha), dependent;spouse  (4 children in home.)   Current Living Arrangements house   Home Accessibility stairs to enter home;stairs within home   Number of Stairs, Main Entrance 4   Stair Railings, Main Entrance railing on left side (ascending)   Number of Stairs, Within Home, Primary eight   Stair Railings, Within Home, Primary railing on right side (ascending)   Transportation Anticipated family or friend will provide   Self-Care   Usual Activity Tolerance good   Current Activity Tolerance fair   Regular Exercise Yes   Activity/Exercise Type walking   Exercise Amount/Frequency daily   Equipment Currently Used at Home none   Fall history within last six months no   General Information   Onset of Illness/Injury or Date of Surgery 12/03/24   Referring Physician Felipe Mosquera APRN CNP   Patient/Family Therapy Goals Statement (PT) Return home safely after right TKA procedure on 12/03/2024.   Pertinent History of Current Problem (include personal factors and/or comorbidities that impact the POC) Patient is a 39 year old admitted to the hospital for right medial partial TKA procedure and is currently POD 0 status from this procedure. Patient is baseline IND with all functional transfers and ambulation prior to surgery, comes from [facility] level home with his wife and 4 kids. Patient other past medical history includes right cauliflower ear, erectile dysfunction, internal hemorrhoids, and multiple joint pain.   Existing Precautions/Restrictions fall;weight bearing  (Fall risk due to right knee instability from continued right lower extremity spinal block numbness at today's session.)   Weight-Bearing Status - LUE full weight-bearing    Weight-Bearing Status - RUE full weight-bearing   Weight-Bearing Status - LLE full weight-bearing   Weight-Bearing Status - RLE weight-bearing as tolerated   General Observations Patient grossly NAD, pleasant, and well groomed throughout today's session.   Cognition   Affect/Mental Status (Cognition) WFL   Orientation Status (Cognition) oriented x 4   Follows Commands (Cognition) [facility]   Pain Assessment   Patient Currently in Pain Yes, see Vital Sign flowsheet  (3-4/10 for right knee.)   Integumentary/Edema   Integumentary/Edema other (describe)   Integumentary/Edema Comments No abnormal right knee post-operative right partial TKA integumentary signs observed at today's session with mild compression wrap donned over the surgical incisional scar at today's session.   Posture    Posture Not impaired   Range of Motion (ROM)   Range of Motion ROM is [facility]   ROM Comment For bilateral upper and lower extremities; Right knee flexion active ROM with heel slide to 79 degreess; Right knee extension with popliteal region able to rest flat on bed during today's session.   Strength (Manual Muscle Testing)   Strength (Manual Muscle Testing) strength is [facility]   Strength Comments For bilateral upper and lower extremities; Right lower extremity quad set is 4-/5 with min tactile cueing for increased activation, with right hamstring set at 4+/5 in same supine in bed testing position during today's session.   Bed Mobility   Bed Mobility no deficits identified;supine-sit;sit-supine;rolling left   Rolling Left [place] (Bed Mobility) independent   Supine-Sit [place] (Bed Mobility) independent   Sit-Supine [place] (Bed Mobility) independent   Bed Mobility Limitations decreased ability to use legs for bridging/pushing   Impairments Contributing to Impaired Bed Mobility decreased flexibility;abnormal muscle tone;pain;decreased ROM;decreased sensation;decreased strength   Assistive Device (Bed Mobility) other (see  comments)  (None.)   Transfers   Transfers bed-chair transfer;sit-stand transfer;toilet transfer   Maintains Weight-bearing Status (Transfers) able to maintain   Transfer Safety Concerns Noted decreased step length;decreased weight-shifting ability  (Significant continued right antalgia with reduced right weightshifting with all upright mobility today due to increased right knee pain and fear of instability based buckling at today's session.)   Impairments Contributing to Impaired Transfers decreased flexibility;abnormal muscle tone;pain;decreased ROM;decreased sensation;decreased strength   Bed-Chair Transfer   Assistive Device (Bed-Chair Transfers) walker, front-wheeled   Bed-Chair [place] (Transfers) modified independence   Sit-Stand Transfer   Sit-Stand [place] (Transfers) modified independence   Assistive Device (Sit-Stand Transfers) walker, front-wheeled   Toilet Transfer   [place] Level (Toilet Transfer) modified independence   Assistive Device (Toilet Transfer) walker, front-wheeled   Type (Toilet Transfer) sit-stand;stand-sit;stand pivot/stand step   Gait/Stairs (Locomotion)   [place] Level (Gait) contact guard   Assistive Device (Gait) walker, 4-wheeled   Distance in Feet (Gait) 15  (within room during today's session.)   Pattern (Gait) step-to   Deviations/Abnormal Patterns (Gait) antalgic;base of support, narrow;cecile decreased;gait speed decreased;stride length decreased;weight shifting decreased  (Significant continued right antalgia with reduced right weightshifting with all upright mobility today due to increased right knee pain and fear of instability based buckling at today's session.)   Maintains Weight-bearing Status (Gait) able to maintain   Comment, (Gait/Stairs) Stairs not assessed today due to patient's continued right knee instability, gluteal and distal lower leg numbness, and election to not attempt today due to fear of buckling from lack of sensation in his surgical  right knee.   Balance   Balance other (describe)   Sitting Balance: Static good balance   Sitting Balance: Dynamic good balance   Sit-to-Stand Balance fair balance   Standing Balance: Static good balance   Standing Balance: Dynamic fair balance   Balance Quick Add Sitting balance: Static;Sitting balance: dynamic;Sit to stand balance;Standing balance: static;Standing balance: dynamic   Sensory Examination   Sensory Perception other (describe)   Sensory Perception Comments Continued right lower extremity partial TKA incisional, gross right gluteal, and diffuse distal right shin numbness upon supine right lower extremity light touch sensation testing during today's session; Left lower extremity is grossly WFL's.   Coordination   Coordination no deficits were identified   Muscle Tone   Muscle Tone no deficits were identified   Clinical Impression   Criteria for Skilled Therapeutic Intervention Other (see comments)  (Evaluation, POD 0 treat, and POD 1 treat only.)   PT Diagnosis (PT) Right knee pain, weakness, ROM, and instability deficits; Gait and balance deficits.   Influenced by the following impairments Right medial partial knee TKA replacement status from 12/03/2024.   Functional limitations due to impairments Patient currently below baseline IND mobility status as a result of his current right knee functional impairments detailed above. Patient is currently MOD IND with all functional transfers using front wheeled walker for support, is also CGA x1 for ambulation x15 feet within his room with use of front wheeled walker.   Clinical Presentation (PT Evaluation Complexity) stable   Clinical Presentation Rationale Based on observation, history, evaluation and clinical judgment.   Clinical Decision Making (Complexity) low complexity   Planned Therapy Interventions (PT) balance training;bed mobility training;cryotherapy;gait training;home exercise program;motor coordination training;neuromuscular  re-education;patient/family education;ROM (range of motion);stair training;strengthening;stretching;transfer training;progressive activity/exercise;risk factor education;home program guidelines   Risk & Benefits of therapy have been explained evaluation/treatment results reviewed;care plan/treatment goals reviewed;risks/benefits reviewed;current/potential barriers reviewed;participants voiced agreement with care plan;participants included;patient;spouse/significant other   Clinical Impression Comments Patient is a 39 year old male POD 0 from a right medial partial TKA procedure today on 12/03/2024. Patient is below his previous IND mobility status with all transfers and ambulation per his aforementioned physical impairments detailed above after today's procedure. Patient is from home with wife and his 4 kids, was unable to try hallway distance walking or stair trial today due to patient report of not feeling full sensation and stability of his surgical right limb during today's session. Thus, recommend patient to discharge from hospital on 12/04/2024 to home with wife assist and initiation of formal outpatient physical therapy services if feeling improved right lower extremity sensation and safe demonstration of required ambulation and stair performance with PT in the morning of 12/04/2024.   PT Total Evaluation Time   PT Eval, Low Complexity Minutes (95194) 25   Physical Therapy Goals   PT Frequency Other (see comments)  (Evaluation, POD 0 treat, and POD 1 treat only.)   PT Predicted Duration/Target Date for Goal Attainment 12/04/24   PT Goals Bed Mobility;Transfers;Gait;Stairs   PT: Bed Mobility Independent;Supine to/from sit;Rolling;Within precautions;Goal Met   PT: Transfers Modified independent;Sit to/from stand;Bed to/from chair;Assistive device;Within precautions;Goal Met   PT: Gait Modified independent;Assistive device;Rolling walker;Within precautions;100 feet   PT: Stairs Modified independent;Assistive  device;Within precautions;4 stairs;8 stairs;Rail on left;Rail on right  (Single left railing with stairs to enter home, single right railing with stairs to go upstairs within his home.)   Interventions   Interventions Quick Adds Therapeutic Procedure   Therapeutic Procedure/Exercise   Ther. Procedure: strength, endurance, ROM, flexibillity Minutes (46302) 25   Symptoms Noted During/After Treatment increased pain  (Increased right knee pain.)   Treatment Detail/Skilled Intervention Patient resting comfortably in supine upon arrival to today's session. Patient able to complete 10 reps of the following with his right lower extremity in supine and in longsitting while sitting in bedside chair: Bilateral ankle pumps, bilateral glute sets with 5 second holds, quad sets with 5 second holds, hamstring sets with 5 second holds, short arc quats, heel slides with 5 second holds, and hip abduction and adduction active ROM's. Patient education regarding proper RICE method for right knee joint pain and inflammation management with proper ice pack application and pillow setup for elevation, to ambulate progressively tolerable distances one time per hour with front wheeled walker during waking hours, perform x10 reps of incentive spirometer per waking hours, and to perform HEP exercises 3x per day before initiation of formal outpatient physical therapy services. Patient resting comfortably sitting up in bedside chair with call light near, tray table and food near, HEP paper handout left in room, all questions answered, all needs met, and RN notified of patient's physical status at conclusion of today's session.   PT Discharge Planning   PT Plan Evaluation and POD 0 & 1 treat only: HEP exercises, stairs, and ambulation using front wheeled walker..   PT Discharge Recommendation (DC Rec) home with assist;home with outpatient physical therapy   PT Rationale for DC Rec Patient is a 39 year old male POD 0 from a right medial partial TKA  procedure today on 12/03/2024. Patient is below his previous IND mobility status with all transfers and ambulation per his aforementioned physical impairments detailed above after today's procedure. Patient is from home with wife and his 4 kids, was unable to try hallway distance walking or stair trial today due to patient report of not feeling full sensation and stability of his surgical right limb during today's session. Thus, recommend patient to discharge from hospital on 12/04/2024 to home with wife assist and initiation of formal outpatient physical therapy services if feeling improved right lower extremity sensation and safe demonstration of required ambulation and stair performance with PT in the morning of 12/04/2024.   PT Brief overview of current status MOD IND with sit to stand, toilet, and bed to chair transfers using front wheeled walker; IND with all bed mobility; CGA x1 for x15 feet of ambulation using front wheeled walker for support; Stairs not attempted today.   Physical Therapy Time and Intention   Timed Code Treatment Minutes 25   Total Session Time (sum of timed and untimed services) 50     Thank you for your referral,  Gaetano Rouse, PT, DPT    [facility]  O: [phone]  E: [email or web address]

## 2024-12-04 NOTE — PROGRESS NOTES
S-(situation): Patient discharged to home via wheelchair with family transport    B-(background): Observation goals met yes    A-(assessment): A&Ox4. Up independent in room with walker. CMS intact. VSS on RA. Pain managed with robaxin and 10 mg oxycodone x1. Cleared by PT. Dressing CDI. Post-op education completed with pt and pt's spouse.     R-(recommendations): Discharge instructions reviewed with pt and pt's spouse.   Listed belongings gathered and returned to patient.yes  Patient Education resolved: Yes  New medications-Pt. Has been educated about reason of use and side effects Yes  Home medications returned to patient NA  Medication Bin checked and emptied on discharge Yes

## 2024-12-04 NOTE — TELEPHONE ENCOUNTER
Reason for Call:  Other appointment    Detailed comments: Patient calling to state that rehab isn't available until 12/27 if we need to him to be seen sooner, he can go to [facility] but they would require a new order. Please advise- he says we can message him on [employer] or call him    Phone Number Patient can be reached at: Home number on file [phone] (home)    Best Time: any    Can we leave a detailed message on this number? YES    Call taken on 12/4/2024 at 10:31 AM by Ana Barnes

## 2024-12-04 NOTE — PROGRESS NOTES
Patient vital signs are at baseline: Yes  Patient able to ambulate as they were prior to admission or with assist devices provided by therapies during their stay:  not able to do stairs yet  Patient MUST void prior to discharge:  Yes  Patient able to tolerate oral intake:  Yes  Pain has adequate pain control using Oral analgesics:  Yes  Does patient have an identified :  Yes  Has goal D/C date and time been discussed with patient:  Yes    Silver Nitrate Text: The wound bed was treated with silver nitrate after the biopsy was performed.

## 2024-12-04 NOTE — TELEPHONE ENCOUNTER
Please see Mychart encounter from 12/4/24 for additional information.     Faduom Chiu RN   [facility]

## 2024-12-04 NOTE — TELEPHONE ENCOUNTER
Patient returned call, he states that he would like the physical therapy referral sent to [facility] in [place] at [phone]. Referral can also be email to them at [email or web address]. Referral has been faxed and patient was updated via [employer].     Fadumo Chiu RN   [facility]

## 2024-12-04 NOTE — TELEPHONE ENCOUNTER
[facility] calling to see if there was any specific protocol they were to follow for this pt- please call back and advise at [phone]

## 2024-12-04 NOTE — PROGRESS NOTES
[facility]  Orthopedics Progress Note           Assessment and Plan:    Assessment:   Post-operative day #1 Procedure(s):  right knee shahid assisted partial medial knee replacement         Plan:   Encourage IS  Start or continue physical therapy  Activity as tolerated  Weight-bear as tolerated.  Discharge from hospital today.  Pain control measures  Advance diet as tolerated  Routine wound care  DVT Prophylaxis: Aspirin , SCD's, Compression Hose  No acute medical issues.            Interval History:   Doing well.  Continues to improve.  Pain is controlled. Improved from last night.  No fevers.  Has been ambulating with nursing and voiding. Tolerating tolerated oral intake. Initially reports some nausea last night but improved this am. Voiding.  Was not able to complete stairs last night and does have stairs at home and to get to his bedroom.  Will work with therapy this AM and anticipate discharge after.           Review of Systems:    The patient denies any chest pain, shortness of breath, excessive pain, fever, chills, purulent drainage from the wound, nausea or vomiting.               Physical Exam:   General: awake, alert, appropriate and in no acute distress  Blood pressure 137/82, pulse 89, temperature 98  F (36.7  C), temperature source Oral, resp. rate 18, weight 86.2 kg (190 lb), SpO2 96%.  Right lower extremity:  Dressing clean, dry and intact. Surrounding skin intact, no breakdown. Calf is soft, nontender with no significant swelling. Distal neurovascular grossly intact.  Compartments soft and non-tender.  2+ distal pulse, sensation intact to foot, able to df/pf against resistance. Brisk cap refill.            Data:     Results for orders placed or performed during the hospital encounter of 12/03/24 (from the past 24 hours)   XR Knee Port Right 1/2 Views    Narrative    XR KNEE PORT RIGHT 1/2 VIEWS   12/3/2024 3:27 PM     HISTORY: Post-Op Total Knee  COMPARISON: None.       Impression     IMPRESSION:     There are immediate postoperative changes from right medial  unicompartmental knee arthroplasty, in standard alignment. Negative  for acute periprosthetic fracture.    CHANG GARCIA MD         SYSTEM ID:  [id number]   Hemoglobin   Result Value Ref Range    Hemoglobin 15.3 13.3 - 17.7 g/dL   Glucose   Result Value Ref Range    Glucose 137 (H) 70 - 99 mg/dL    Patient Fasting > 8hrs? Unknown      NICHOLAS Marinelli, CNP  Orthopedic Surgery

## 2024-12-05 ENCOUNTER — TELEPHONE (OUTPATIENT)
Dept: SURGERY | Facility: CLINIC | Age: 39
End: 2024-12-05
Payer: COMMERCIAL

## 2024-12-05 ENCOUNTER — MYC MEDICAL ADVICE (OUTPATIENT)
Dept: ORTHOPEDICS | Facility: CLINIC | Age: 39
End: 2024-12-05
Payer: COMMERCIAL

## 2024-12-05 NOTE — TELEPHONE ENCOUNTER
Please see [employer] message from 12/5/24 for additional information.     Fadumo Chiu RN   [facility]

## 2024-12-05 NOTE — TELEPHONE ENCOUNTER
Called and spoke with patient. He is most concerned that he feels swelling has increased today, pain is more with the block wearing off and struggling with straight leg raises, heel slides, and other exercises. He reports was active yesterday with getting home, with doing the exercises, stairs, and walking. Has been resting with his leg elevated most of today.  Explained that day 2/3 tends to be the about the worst especially as the block wears off. He is taking medications as prescribed and reports the oxycodone does help in that he is able to sleep with taking it.  No new injuries.  Discussed options. Provided reassurance.  Recommended he continue to monitor and reach out tomorrow with his status and progress.  Anticipate he will be able to start feeling like he is able to do more tomorrow.  Could consider switching medications if the oxycodone not providing any relief though sounds like it is currently.        NICHOLAS Marinelli, CNP  Orthopedic Surgery

## 2024-12-05 NOTE — TELEPHONE ENCOUNTER
Reason for Call:  Medication or medication refill:    Do you use a [facility]?  Name of the pharmacy and phone number for the current request:   [facility]    Name of the medication requested: anti-inflammatory medication    Other request: Kenn had knee surgery on Tuesday, he said his knee is quite swollen, he would like to get an anti-inflammatory medication prescribed and sent to the [facility]    Can we leave a detailed message on this number? YES    Phone number patient can be reached at: Home number on file [phone] (home)    Best Time:     Call taken on 12/5/2024 at 1:10 PM by Erinn Ying

## 2024-12-05 NOTE — TELEPHONE ENCOUNTER
Order and patient information has been re-faxed along with protocol.     Fadumo Chiu RN   [facility]

## 2024-12-08 ASSESSMENT — ACTIVITIES OF DAILY LIVING (ADL)
LIMPING: THE SYMPTOM AFFECTS MY ACTIVITY SEVERELY
KNEEL ON THE FRONT OF YOUR KNEE: I AM UNABLE TO DO THE ACTIVITY
SQUAT: I AM UNABLE TO DO THE ACTIVITY
RISE FROM A CHAIR: ACTIVITY IS VERY DIFFICULT
GO DOWN STAIRS: I AM UNABLE TO DO THE ACTIVITY
KNEE_ACTIVITY_OF_DAILY_LIVING_SUM: 8
STIFFNESS: THE SYMPTOM AFFECTS MY ACTIVITY SEVERELY
AS_A_RESULT_OF_YOUR_KNEE_INJURY,_HOW_WOULD_YOU_RATE_YOUR_CURRENT_LEVEL_OF_DAILY_ACTIVITY?: SEVERELY ABNORMAL
PLEASE_INDICATE_YOR_PRIMARY_REASON_FOR_REFERRAL_TO_THERAPY:: KNEE
GO UP STAIRS: I AM UNABLE TO DO THE ACTIVITY
HOW_WOULD_YOU_RATE_THE_OVERALL_FUNCTION_OF_YOUR_KNEE_DURING_YOUR_USUAL_DAILY_ACTIVITIES?: SEVERELY ABNORMAL
GO DOWN STAIRS: I AM UNABLE TO DO THE ACTIVITY
WALK: I AM UNABLE TO DO THE ACTIVITY
HOW_WOULD_YOU_RATE_THE_OVERALL_FUNCTION_OF_YOUR_KNEE_DURING_YOUR_USUAL_DAILY_ACTIVITIES?: SEVERELY ABNORMAL
WEAKNESS: THE SYMPTOM AFFECTS MY ACTIVITY SEVERELY
HOW_WOULD_YOU_RATE_THE_CURRENT_FUNCTION_OF_YOUR_KNEE_DURING_YOUR_USUAL_DAILY_ACTIVITIES_ON_A_SCALE_FROM_0_TO_100_WITH_100_BEING_YOUR_LEVEL_OF_KNEE_FUNCTION_PRIOR_TO_YOUR_INJURY_AND_0_BEING_THE_INABILITY_TO_PERFORM_ANY_OF_YOUR_USUAL_DAILY_ACTIVITIES?: 10
PAIN: THE SYMPTOM AFFECTS MY ACTIVITY SEVERELY
GO UP STAIRS: I AM UNABLE TO DO THE ACTIVITY
HOW_WOULD_YOU_RATE_THE_CURRENT_FUNCTION_OF_YOUR_KNEE_DURING_YOUR_USUAL_DAILY_ACTIVITIES_ON_A_SCALE_FROM_0_TO_100_WITH_100_BEING_YOUR_LEVEL_OF_KNEE_FUNCTION_PRIOR_TO_YOUR_INJURY_AND_0_BEING_THE_INABILITY_TO_PERFORM_ANY_OF_YOUR_USUAL_DAILY_ACTIVITIES?: 10
AS_A_RESULT_OF_YOUR_KNEE_INJURY,_HOW_WOULD_YOU_RATE_YOUR_CURRENT_LEVEL_OF_DAILY_ACTIVITY?: SEVERELY ABNORMAL
RAW_SCORE: 8
KNEE_ACTIVITY_OF_DAILY_LIVING_SCORE: 11.43
WALK: I AM UNABLE TO DO THE ACTIVITY
SWELLING: THE SYMPTOM AFFECTS MY ACTIVITY SEVERELY
GIVING WAY, BUCKLING OR SHIFTING OF KNEE: THE SYMPTOM AFFECTS MY ACTIVITY SEVERELY
LIMPING: THE SYMPTOM AFFECTS MY ACTIVITY SEVERELY
STIFFNESS: THE SYMPTOM AFFECTS MY ACTIVITY SEVERELY
RISE FROM A CHAIR: ACTIVITY IS VERY DIFFICULT
KNEEL ON THE FRONT OF YOUR KNEE: I AM UNABLE TO DO THE ACTIVITY
GIVING WAY, BUCKLING OR SHIFTING OF KNEE: THE SYMPTOM AFFECTS MY ACTIVITY SEVERELY
SIT WITH YOUR KNEE BENT: I AM UNABLE TO DO THE ACTIVITY
STAND: ACTIVITY IS VERY DIFFICULT
SWELLING: THE SYMPTOM AFFECTS MY ACTIVITY SEVERELY
PAIN: THE SYMPTOM AFFECTS MY ACTIVITY SEVERELY
SQUAT: I AM UNABLE TO DO THE ACTIVITY
SIT WITH YOUR KNEE BENT: I AM UNABLE TO DO THE ACTIVITY
STAND: ACTIVITY IS VERY DIFFICULT
WEAKNESS: THE SYMPTOM AFFECTS MY ACTIVITY SEVERELY

## 2024-12-09 ENCOUNTER — THERAPY VISIT (OUTPATIENT)
Dept: PHYSICAL THERAPY | Facility: CLINIC | Age: 39
End: 2024-12-09
Attending: ORTHOPAEDIC SURGERY
Payer: COMMERCIAL

## 2024-12-09 DIAGNOSIS — Z96.651 STATUS POST RIGHT PARTIAL KNEE REPLACEMENT: Primary | ICD-10-CM

## 2024-12-09 PROCEDURE — 97110 THERAPEUTIC EXERCISES: CPT | Mod: GP | Performed by: PHYSICAL THERAPIST

## 2024-12-09 PROCEDURE — 97161 PT EVAL LOW COMPLEX 20 MIN: CPT | Mod: GP | Performed by: PHYSICAL THERAPIST

## 2024-12-09 NOTE — PROGRESS NOTES
PHYSICAL THERAPY EVALUATION  Type of Visit: Evaluation              Subjective         Presenting condition or subjective complaint: (Patient-Rptd) Partial knee replacement  Pt is a 40 yo male who presents to PT following R medial partial knee arthroplasty on 12/3/24. Pt has been performing his exercises relatively frequently with pain limiting his ability to perform.  Date of onset: 12/03/24    Relevant medical history: (Patient-Rptd) Arthritis   Past Medical History:   Diagnosis Date    Anxiety     Bipolar affective (H)     Emotional disorder (H)     Schizoaffective disorder (H)        Dates & types of surgery: (Patient-Rptd) Proor to partial knee replacement i had my miniscus scoped out, that is what lead to the partial knee replacement.   Past Surgical History:   Procedure Laterality Date    ARTHROPLASTY, KNEE UNICOMPARTMENTAL SHAHID ROBOT Right 12/3/2024    Procedure: right knee shahid assisted partial medial knee replacement;  Surgeon: Zac Marie DO;  Location: [facility]         Prior diagnostic imaging/testing results: (Patient-Rptd) CT scan; X-ray     Prior therapy history for the same diagnosis, illness or injury: (Patient-Rptd) No      Prior Level of Function  Transfers:   Ambulation:   ADL:   IADL:     Living Environment  Social support: (Patient-Rptd) With family members   Type of home: (Patient-Rptd) House; Multi-level   Stairs to enter the home: (Patient-Rptd) Yes (Patient-Rptd) 5 Is there a railing: (Patient-Rptd) Yes     Ramp: (Patient-Rptd) No   Stairs inside the home: (Patient-Rptd) Yes (Patient-Rptd) 8 Is there a railing: (Patient-Rptd) Yes     Help at home: (Patient-Rptd) Other  Equipment owned: (Patient-Rptd) Crutches     Employment: (Patient-Rptd) Yes (Patient-Rptd) Patient   Hobbies/Interests: (Patient-Rptd) Wrestling and playing with my kids outside/indoors.    Patient goals for therapy: (Patient-Rptd) Function normally im in a pot pf pain right now 3 days post op    Pain  assessment: Pain present     Objective   KNEE EVALUATION  PAIN: Pain Level at Rest: 8/10  Pain Level with Use: 10/10  INTEGUMENTARY (edema, incisions):   POSTURE:   GAIT:  Weightbearing Status: WBAT  Assistive Device(s): Crutches (bilateral)  Gait Deviations: Antalgic  Stride length decreased  Chantell decreased  BALANCE/PROPRIOCEPTION:   WEIGHTBEARING ALIGNMENT:   NON-WEIGHTBEARING ALIGNMENT:   ROM:   (Degrees) Left AROM Left PROM  Right AROM Right PROM   Knee Flexion   60 deg    Knee Extension   Lacking 7 deg    Pain:   End feel:     STRENGTH:   FLEXIBILITY:   SPECIAL TESTS:   FUNCTIONAL TESTS:   PALPATION:   JOINT MOBILITY:     Assessment & Plan   CLINICAL IMPRESSIONS  Medical Diagnosis: z96.651 R medial partial knee arthroplasty    Treatment Diagnosis: R partial KA, impaired functional mobility reduced R knee ROM, weakness   Impression/Assessment:  Pt presents to PT with elevated pain, reduced ROM in R knee, impaired functional mobility with need to walk with B axillary crutches. Pt would benefit from skilled PT interventions in order to address his pain, reduced knee ROM, and advance his mobility for return to preferred recreational activities of wrestling and playing with his kids.    Clinical Decision Making (Complexity):  Clinical Presentation: Stable/Uncomplicated  Clinical Presentation Rationale: based on medical and personal factors listed in PT evaluation  Clinical Decision Making (Complexity): Low complexity    PLAN OF CARE  Treatment Interventions:  Interventions: Gait Training, Manual Therapy, Neuromuscular Re-education, Therapeutic Activity, Therapeutic Exercise    Long Term Goals     PT Goal 1  Goal Identifier: HEP  Goal Description: Pt will demo independence in performance and progression of strengthening and balance HEP in order to improve CLOF.  Target Date:  (Ongoing, updates as needed)  PT Goal 2  Goal Identifier: KOS  Goal Description: Pt will demo improved knee pain ratings and function as  shown by increased KOS score of at least 7 points in order to show significant improvement and greater return to previous function.  Goal Progress: 8/70 (eval)  Target Date: 02/03/25  PT Goal 3  Goal Identifier: ROM  Goal Description: Pt will demo 0-120 R knee ROM for normal gait pattern and functional mobility in his home and the comunity.  Goal Progress: 7-60 AROM (eval)  Target Date: 02/03/25  PT Goal 4  Goal Identifier: Gait  Goal Description: Pt will demo normal gait pattern without use of AD in order to return to PLOF and activity levels for greater enjoyment of work and recreational activities.  Goal Progress: Pt with pain up to 10/10, walking with B axillary crutches (eval)      Frequency of Treatment: 1-2x/week  Duration of Treatment: 8 weeks    Recommended Referrals to Other Professionals:   Education Assessment:   Learner/Method: Patient;Listening;Demonstration    Risks and benefits of evaluation/treatment have been explained.   Patient/Family/caregiver agrees with Plan of Care.     Evaluation Time:     PT Eval, Low Complexity Minutes (68222): 15       Signing Clinician: Floyd Conti PT

## 2024-12-11 ENCOUNTER — MYC MEDICAL ADVICE (OUTPATIENT)
Dept: ORTHOPEDICS | Facility: CLINIC | Age: 39
End: 2024-12-11
Payer: COMMERCIAL

## 2024-12-11 DIAGNOSIS — M17.11 PRIMARY OSTEOARTHRITIS OF RIGHT KNEE: ICD-10-CM

## 2024-12-11 DIAGNOSIS — M17.12 PRIMARY OSTEOARTHRITIS OF LEFT KNEE: ICD-10-CM

## 2024-12-12 RX ORDER — GABAPENTIN 100 MG/1
100 CAPSULE ORAL AT BEDTIME
Qty: 21 CAPSULE | Refills: 0 | Status: SHIPPED | OUTPATIENT
Start: 2024-12-12 | End: 2025-01-02

## 2024-12-16 ENCOUNTER — VIRTUAL VISIT (OUTPATIENT)
Dept: FAMILY MEDICINE | Facility: CLINIC | Age: 39
End: 2024-12-16
Payer: COMMERCIAL

## 2024-12-16 ENCOUNTER — THERAPY VISIT (OUTPATIENT)
Dept: PHYSICAL THERAPY | Facility: CLINIC | Age: 39
End: 2024-12-16
Attending: NURSE PRACTITIONER
Payer: COMMERCIAL

## 2024-12-16 DIAGNOSIS — Z96.651 STATUS POST RIGHT PARTIAL KNEE REPLACEMENT: Primary | ICD-10-CM

## 2024-12-16 DIAGNOSIS — Z96.651 S/P RIGHT UNICOMPARTMENTAL KNEE REPLACEMENT: ICD-10-CM

## 2024-12-16 DIAGNOSIS — G47.01 INSOMNIA DUE TO MEDICAL CONDITION: Primary | ICD-10-CM

## 2024-12-16 DIAGNOSIS — Z96.651 S/P RIGHT UNICOMPARTMENTAL KNEE REPLACEMENT: Primary | ICD-10-CM

## 2024-12-16 DIAGNOSIS — K59.03 OPIOID-INDUCED CONSTIPATION: ICD-10-CM

## 2024-12-16 DIAGNOSIS — T40.2X5A OPIOID-INDUCED CONSTIPATION: ICD-10-CM

## 2024-12-16 PROCEDURE — 97110 THERAPEUTIC EXERCISES: CPT | Mod: GP | Performed by: PHYSICAL THERAPIST

## 2024-12-16 PROCEDURE — 97140 MANUAL THERAPY 1/> REGIONS: CPT | Mod: GP | Performed by: PHYSICAL THERAPIST

## 2024-12-16 PROCEDURE — 99214 OFFICE O/P EST MOD 30 MIN: CPT | Mod: 95 | Performed by: INTERNAL MEDICINE

## 2024-12-16 RX ORDER — GABAPENTIN 300 MG/1
CAPSULE ORAL
Qty: 62 CAPSULE | Refills: 0 | Status: SHIPPED | OUTPATIENT
Start: 2024-12-16 | End: 2025-01-17

## 2024-12-16 RX ORDER — HYDROXYZINE HYDROCHLORIDE 25 MG/1
25 TABLET, FILM COATED ORAL EVERY 6 HOURS PRN
Qty: 30 TABLET | Refills: 0 | Status: SHIPPED | OUTPATIENT
Start: 2024-12-16

## 2024-12-16 NOTE — PROGRESS NOTES
"Kenn is a 39 year old who is being evaluated via a billable video visit.    How would you like to obtain your AVS? MyChart  If the video visit is dropped, the invitation should be resent by: Text to cell phone: [phone]  Will anyone else be joining your video visit? No      Assessment & Plan     Pleasant patient with circumstantial insomnia.  This is related to his recent knee surgery.  Has to keep his knee in certain positions and is waking up with pain.      Insomnia due to medical condition  Started on 100 mg of gabapentin just 2 days ago by his orthopedics team.  The patient is 190+ pounds.  The 100 mg is likely significantly underdosed.  I suggest we try 300 mg.  Can go up to 600 mg.  Prescription is written as such.  Recent side effects were discussed.  The patient can take the occasional Tylenol PM as well if he needed.  Would suspect that as his postop pain reduces in his range of motion increases in the position restrictions reduced this will be a temporary thing.    Opioid-induced constipation  The patient had severe constipation with opioids.  He is quite concerned about this as he has had sphincter issues in the past.  The patient notes intermittent bleeding.  The level of this has gone down dramatically since off opioids.  He is maintained on stool softeners.  He does have an appointment with his usual gastroenterology team for January.        BMI  Estimated body mass index is 30.54 kg/m  as calculated from the following:    Height as of 11/21/24: 1.68 m (5' 6.14\").    Weight as of 12/3/24: 86.2 kg (190 lb).         Mirlande Hawk is a 39 year old, presenting for the following health issues:  Sleep Problem        12/16/2024    10:26 AM   Additional Questions   Roomed by Elizabeth     History of Present Illness       Reason for visit:  Requesting sleep medication or pain meds that are non-opioid.   He is taking medications regularly.       Pt is new to me.  This video visit was scheduled due to a " cancellation in the schedule    Kenn explains that he recently had knee surgery. He feels like the recovery is going well.      He experienced marked constipation with post op pain medications. He is now off opioids.  BM have normalized.      He is taking tizanidine and low dose gabapentin.      He is sleepless.  Knee pain is waking him up.      Gabapentin was started 2 days ago.   100mg nightly.  No ill SE but no positive effects either.                      Objective           Vitals:  No vitals were obtained today due to virtual visit.    Physical Exam   GENERAL: alert and no distress  EYES: Eyes grossly normal to inspection.  No discharge or erythema, or obvious scleral/conjunctival abnormalities.  RESP: No audible wheeze, cough, or visible cyanosis.    SKIN: Visible skin clear. No significant rash, abnormal pigmentation or lesions.  NEURO: Cranial nerves grossly intact.  Mentation and speech appropriate for age.  PSYCH: Appropriate affect, tone, and pace of words          Video-Visit Details    Type of service:  Video Visit   Originating Location (pt. Location): Home    Distant Location (provider location):  On-site  Platform used for Video Visit: [place]  Signed Electronically by: Tone Maldonado MD

## 2024-12-16 NOTE — TELEPHONE ENCOUNTER
The following message was received:     [place] [facility] [place]    Refills have been requested for the following medications:    hydrOXYzine HCl (ATARAX) 25 MG tablet [Felipe Mosquera]  Patient Comment: I m out of these meds, thank you.    Preferred pharmacy: [address]

## 2024-12-17 NOTE — PROGRESS NOTES
Orthopedic Clinic Post-Operative Note    CHIEF COMPLAINT:   Chief Complaint   Patient presents with    Right Knee - Surgical Followup      Right knee Shahid assisted partial medial uni- compartment arthroplasty, DOS: 12/3/2024 (15d)       HISTORY OF PRESENT ILLNESS  Today's visit:  Returns. Improving. Attending therapy in [place]. Reports started having regular bowel movement again. Did stop narcotics due to constipation and other side effects. States the knee pain was more difficult to control after stopping narcotics but is managing with tylenol during the day and gabapentin at night. Sleep can be difficult due to pain but gabapentin is helping.  Recently increased to 300 mg with titration up to 600mg at nighttime by Dr. Maldonado.  No new injuries. No incision concerns. Feels his activity is increasing daily. Feels ready to return to work with home office tomorrow and back to work unrestricted January 2nd.  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       December 3, 2024 surgery: Right knee medial unicompartmental arthroplasty    Patient's past medical, surgical, social and family histories reviewed.     Past Medical History:   Diagnosis Date    Anxiety     Bipolar affective (H)     Emotional disorder (H)     Schizoaffective disorder (H)        Past Surgical History:   Procedure Laterality Date    ARTHROPLASTY, KNEE UNICOMPARTMENTAL SHAHID ROBOT Right 12/3/2024    Procedure: right knee shahid assisted partial medial knee replacement;  Surgeon: Zac Marie DO;  Location:  OR       Medications:  Current Outpatient Medications   Medication Sig Dispense Refill    acetaminophen (TYLENOL) 325 MG tablet Take 2 tablets (650 mg) by mouth every 4 hours as needed for other (mild pain). 100 tablet 0    aspirin (ASA) 325 MG EC tablet Take 1 tablet (325 mg) by mouth 2 times daily. 84 tablet 0    fluticasone (FLONASE) 50 MCG/ACT nasal spray Spray 1 spray into both nostrils daily.    "   gabapentin (NEURONTIN) 300 MG capsule Take 1 capsule (300 mg) by mouth at bedtime for 2 days, THEN 2 capsules (600 mg) at bedtime. 62 capsule 0    hydrOXYzine HCl (ATARAX) 25 MG tablet Take 1 tablet (25 mg) by mouth every 6 hours as needed for itching or anxiety (with pain, moderate pain). 30 tablet 0    ondansetron (ZOFRAN ODT) 4 MG ODT tab Take 1 tablet (4 mg) by mouth every 8 hours as needed for nausea. 12 tablet 1    senna-docusate (SENOKOT-S/PERICOLACE) 8.6-50 MG tablet Take 1-2 tablets by mouth 2 times daily. Take while on oral narcotics to prevent or treat constipation. 30 tablet 0    tiZANidine (ZANAFLEX) 4 MG tablet Take 1 tablet (4 mg) by mouth 3 times daily as needed for muscle spasms. 30 tablet 1     No current facility-administered medications for this visit.       Allergies   Allergen Reactions    Dogs        Social History     Occupational History    Not on file   Tobacco Use    Smoking status: Never    Smokeless tobacco: Never   Substance and Sexual Activity    Alcohol use: Not Currently     Comment: Drinks daily    Drug use: No    Sexual activity: Yes     Partners: Female       Family History   Problem Relation Age of Onset    Liver Cancer Maternal Grandfather        REVIEW OF SYSTEMS  General: negative for, night sweats, dizziness, fatigue  Resp: No shortness of breath and no cough  CV: negative for chest pain, syncope or near-syncope  GI: negative for nausea, vomiting and diarrhea  : negative for dysuria and hematuria  Musculoskeletal: as above  Neurologic: negative for syncope   Hematologic: negative for bleeding disorder    Physical Exam:  Vitals: /86   Pulse 82   Temp 98.6  F (37  C)   Ht 1.68 m (5' 6.14\")   BMI 30.54 kg/m    BMI= Body mass index is 30.54 kg/m .  Constitutional: healthy, alert and no acute distress   Psychiatric: mentation appears normal and affect normal/bright  NEURO: no focal deficits  SKIN: incisions x 3 are all healing well, well approximated skin edges, " without signs of infection including no erythema, incision breakdown or purlent drainage  JOINT/EXTREMITIES: right knee: AROM 0-95.  Extensor intact. No valgus or varus instability. Patella tracks midline. Mild swelling to the knee. Mild bruising. mild swelling distal of the knee. Calf soft non-tender. No focal masses, Distal neurovascular grossly intact.   GAIT: not tested     Diagnostic Modalities:  Right knee x-ray: Weightbearing views taken in the clinic shows a right medial unicompartmental arthroplasty in place.  No evidence of hardware failure.  No evidence of loosening.  No evidence of position change.  Independent visualization of the images was performed.      Impression:   Chief Complaint   Patient presents with    Right Knee - Surgical Followup      Right knee Torrey assisted partial medial uni- compartment arthroplasty, DOS: 12/3/2024 (15d)   December 3, 2024 surgery: Right knee medial unicompartmental arthroplasty    Plan:   Activity: increase activity as tolerated.  Discussed work. He is able to take breaks. Able to stop and elevate leg. Rest as needed and able to work around physical therapy schedule. He feels ready to return for remote work starting tomorrow and back to clinic starting the 2nd. Did discussed increased swelling and stiffness after returning to work, and taking breaks to elevate, rest, work on motion, etc. Also discussed that as he gets close to return to work if he is needs more time to call or mychart.   Staples/Sutures out: Not applicable. Monocryl and dermal glue closure.  Ok to leave open. Ok to get wet in shower. Do not soak or submerge.   Pain controlled: Yes. Continue to use: tylenol, rest, activity modification, gabapentin for night.   Immobilzation: No  Physical Therapy:  continue  Rest, Ice, Elevation, Compression  Return to clinic 4, week(s), or sooner as needed for changes.    Re-x-ray on return: No    Dr. Marie was present in the office.  He personally discussed the care  plan and reviewed all appropriate imaging.    NICHOLAS Marinelli, CNP  Orthopedic Surgery

## 2024-12-18 ENCOUNTER — OFFICE VISIT (OUTPATIENT)
Dept: ORTHOPEDICS | Facility: CLINIC | Age: 39
End: 2024-12-18
Payer: COMMERCIAL

## 2024-12-18 ENCOUNTER — ANCILLARY PROCEDURE (OUTPATIENT)
Dept: GENERAL RADIOLOGY | Facility: CLINIC | Age: 39
End: 2024-12-18
Attending: NURSE PRACTITIONER
Payer: COMMERCIAL

## 2024-12-18 VITALS
HEIGHT: 66 IN | HEART RATE: 82 BPM | TEMPERATURE: 98.6 F | BODY MASS INDEX: 30.54 KG/M2 | DIASTOLIC BLOOD PRESSURE: 86 MMHG | SYSTOLIC BLOOD PRESSURE: 127 MMHG

## 2024-12-18 DIAGNOSIS — Z96.651 S/P RIGHT UNICOMPARTMENTAL KNEE REPLACEMENT: Primary | ICD-10-CM

## 2024-12-18 DIAGNOSIS — Z96.651 S/P RIGHT UNICOMPARTMENTAL KNEE REPLACEMENT: ICD-10-CM

## 2024-12-18 PROCEDURE — 99024 POSTOP FOLLOW-UP VISIT: CPT | Performed by: ORTHOPAEDIC SURGERY

## 2024-12-18 PROCEDURE — 73562 X-RAY EXAM OF KNEE 3: CPT | Mod: TC | Performed by: RADIOLOGY

## 2024-12-18 RX ORDER — AMOXICILLIN 250 MG
1-2 CAPSULE ORAL 2 TIMES DAILY
Qty: 30 TABLET | Refills: 0 | Status: SHIPPED | OUTPATIENT
Start: 2024-12-18

## 2024-12-18 RX ORDER — AMOXICILLIN 250 MG
1-2 CAPSULE ORAL 2 TIMES DAILY
Qty: 30 TABLET | Refills: 0 | Status: SHIPPED | OUTPATIENT
Start: 2024-12-18 | End: 2024-12-18

## 2024-12-18 ASSESSMENT — PAIN SCALES - GENERAL: PAINLEVEL_OUTOF10: SEVERE PAIN (6)

## 2024-12-18 NOTE — LETTER
December 18, 2024      Kenn Dobson  [address]  [place] [address]        To Whom It May Concern:    Kenn Dobson was seen in our clinic. He may return to work starting December 19th with the following restrictions: Remote work from home only.  These restrictions are in place until January 1st.  Starting January 2nd may return to work unrestricted.      Sincerely,          NICHOLAS Marinelli, CNP  Orthopedic Surgery

## 2024-12-18 NOTE — Clinical Note
12/18/2024      Kenn Dobson  [address]  [place] [address]      Dear Colleague,    Thank you for referring your patient, Kenn Dobson, to the [facility]. Please see a copy of my visit note below.    Orthopedic Clinic Post-Operative Note    CHIEF COMPLAINT:   Chief Complaint   Patient presents with    Right Knee - Surgical Followup      Right knee Shahid assisted partial medial uni- compartment arthroplasty, DOS: 12/3/2024 (15d)       HISTORY OF PRESENT ILLNESS  Today's visit:  ***      December 3, 2024 surgery: Right knee medial unicompartmental arthroplasty    Patient's past medical, surgical, social and family histories reviewed.     Past Medical History:   Diagnosis Date    Anxiety     Bipolar affective (H)     Emotional disorder (H)     Schizoaffective disorder (H)        Past Surgical History:   Procedure Laterality Date    ARTHROPLASTY, KNEE UNICOMPARTMENTAL SHAHID ROBOT Right 12/3/2024    Procedure: right knee shahid assisted partial medial knee replacement;  Surgeon: Zac Marie DO;  Location:  OR       Medications:  Current Outpatient Medications   Medication Sig Dispense Refill    acetaminophen (TYLENOL) 325 MG tablet Take 2 tablets (650 mg) by mouth every 4 hours as needed for other (mild pain). 100 tablet 0    aspirin (ASA) 325 MG EC tablet Take 1 tablet (325 mg) by mouth 2 times daily. 84 tablet 0    fluticasone (FLONASE) 50 MCG/ACT nasal spray Spray 1 spray into both nostrils daily.      gabapentin (NEURONTIN) 300 MG capsule Take 1 capsule (300 mg) by mouth at bedtime for 2 days, THEN 2 capsules (600 mg) at bedtime. 62 capsule 0    hydrOXYzine HCl (ATARAX) 25 MG tablet Take 1 tablet (25 mg) by mouth every 6 hours as needed for itching or anxiety (with pain, moderate pain). 30 tablet 0    ondansetron (ZOFRAN ODT) 4 MG ODT tab Take 1 tablet (4 mg) by mouth every 8 hours as needed for nausea. 12 tablet 1    senna-docusate (SENOKOT-S/PERICOLACE) 8.6-50  "MG tablet Take 1-2 tablets by mouth 2 times daily. Take while on oral narcotics to prevent or treat constipation. 30 tablet 0    tiZANidine (ZANAFLEX) 4 MG tablet Take 1 tablet (4 mg) by mouth 3 times daily as needed for muscle spasms. 30 tablet 1     No current facility-administered medications for this visit.       Allergies   Allergen Reactions    Dogs        Social History     Occupational History    Not on file   Tobacco Use    Smoking status: Never    Smokeless tobacco: Never   Substance and Sexual Activity    Alcohol use: Not Currently     Comment: Drinks daily    Drug use: No    Sexual activity: Yes     Partners: Female       Family History   Problem Relation Age of Onset    Liver Cancer Maternal Grandfather        REVIEW OF SYSTEMS  General: negative for, night sweats, dizziness, fatigue  Resp: No shortness of breath and no cough  CV: negative for chest pain, syncope or near-syncope  GI: negative for nausea, vomiting and diarrhea  : negative for dysuria and hematuria  Musculoskeletal: as above  Neurologic: negative for syncope   Hematologic: negative for bleeding disorder    Physical Exam:  Vitals: /86   Pulse 82   Temp 98.6  F (37  C)   Ht 1.68 m (5' 6.14\")   BMI 30.54 kg/m    BMI= Body mass index is 30.54 kg/m .  Constitutional: healthy, alert and no acute distress   Psychiatric: mentation appears normal and affect normal/bright  NEURO: no focal deficits  SKIN: .{LORGE SKIN EXAM:384517}  JOINT/EXTREMITIES: {ORTHO:103475}  GAIT: {GAIT:779727}    Diagnostic Modalities:  Right knee x-ray: Weightbearing views taken in the clinic shows a right medial unicompartmental arthroplasty in place.  No evidence of hardware failure.  No evidence of loosening.  No evidence of position change.  Independent visualization of the images was performed.      Impression:   Chief Complaint   Patient presents with    Right Knee - Surgical Followup      Right knee Torrey assisted partial medial uni- compartment " arthroplasty, DOS: 12/3/2024 (15d)   December 3, 2024 surgery: Right knee medial unicompartmental arthroplasty    Plan:   Activity: {ijeoma upper and lower ext limts:981186}  Staples/Sutures out: {IJEOMA YES NO NOT APPLICABLE:598743}  Pain controlled: {Yes / No:778764}. Continue to use: {IJEOMA HPI FACTORS:294658}  Immobilzation: {Yes / No:621996}  Physical Therapy: {IJEOMA PHYSICAL THERAPY RECOMMENDATION:865772}  {jclpostopplan:650543}  Return to clinic {IJEOMA RTC WHEN:978679}, or sooner as needed for changes.    Re-x-ray on return: {IJEOMA YES NO NOT APPLICABLE:441759}    Maicol Marie D.O.      Again, thank you for allowing me to participate in the care of your patient.        Sincerely,        Zac Marie, DO

## 2024-12-19 ENCOUNTER — THERAPY VISIT (OUTPATIENT)
Dept: PHYSICAL THERAPY | Facility: CLINIC | Age: 39
End: 2024-12-19
Attending: NURSE PRACTITIONER
Payer: COMMERCIAL

## 2024-12-19 DIAGNOSIS — Z96.651 S/P RIGHT UNICOMPARTMENTAL KNEE REPLACEMENT: Primary | ICD-10-CM

## 2024-12-19 PROCEDURE — 97110 THERAPEUTIC EXERCISES: CPT | Mod: GP

## 2024-12-23 ENCOUNTER — THERAPY VISIT (OUTPATIENT)
Dept: PHYSICAL THERAPY | Facility: CLINIC | Age: 39
End: 2024-12-23
Attending: ORTHOPAEDIC SURGERY
Payer: COMMERCIAL

## 2024-12-23 DIAGNOSIS — Z96.651 S/P RIGHT UNICOMPARTMENTAL KNEE REPLACEMENT: Primary | ICD-10-CM

## 2024-12-23 DIAGNOSIS — Z96.651 S/P RIGHT UNICOMPARTMENTAL KNEE REPLACEMENT: ICD-10-CM

## 2024-12-23 PROCEDURE — 97110 THERAPEUTIC EXERCISES: CPT | Mod: GP | Performed by: PHYSICAL THERAPIST

## 2024-12-23 PROCEDURE — 97140 MANUAL THERAPY 1/> REGIONS: CPT | Mod: GP | Performed by: PHYSICAL THERAPIST

## 2024-12-23 NOTE — TELEPHONE ENCOUNTER
Attempted to call patient, no answer. Left voicemail and requested patient call back at [phone].     Patient sent refill request via [employer] for acetaminophen (TYLENOL) 325 mg tablets. Patient's last refill was on 12/4/2024 for 100 tablets. Upon return call, please find out how patient is doing and if a refill is appropriate.     Melissa Walton RN   [facility]

## 2024-12-23 NOTE — TELEPHONE ENCOUNTER
"Last Written Prescription Date:  12/3/2024  Last Fill Quantity: 100,  # refills: 0   Last office visit: No previous visit found with prescribing provider:  NICHOLAS Marinelli CNP     Requested Prescriptions   Pending Prescriptions Disp Refills    acetaminophen (TYLENOL) 325 MG tablet 100 tablet 0     Sig: Take 2 tablets (650 mg) by mouth every 4 hours as needed for other (mild pain).       Analgesics (Non-Narcotic Tylenol and ASA Only) Failed - 12/23/2024  1:44 PM        Failed - Medication matches indication     Acetaminophen is associated with one or more of the following diagnoses:  Pain  Fever          Passed - Patient is 7 months old or older     If patient is a peds patient of the age 7 mos -12 years, ok to refill using weight-based dosing.     If >3g daily and/or sig is not \"prn\", check for liver enzymes. If normal in the last year, ok to refill.  If not, refer to the provider.          Passed - Medication is active on med list        Passed - Recent (12 mo) or future (90 days) visit within the authorizing provider's specialty     The patient must have completed an in-person or virtual visit within the past 12 months or has a future visit scheduled within the next 90 days with the authorizing provider s specialty.  Urgent care and e-visits do not qualify as an office visit for this protocol.               Unable to refill per RN protocol. Routing to provider to review/approve.     Spoke with patient who reports that he is out of the acetaminophen (TYLENOL) 325 mg tablets and would like a new script sent in. Patient reports that he is taking 2 tablets 3x daily.    Melissa Walton RN on 12/23/2024 at 1:43 PM    "

## 2024-12-24 RX ORDER — ACETAMINOPHEN 325 MG/1
650 TABLET ORAL EVERY 4 HOURS PRN
Qty: 100 TABLET | Refills: 0 | Status: SHIPPED | OUTPATIENT
Start: 2024-12-24

## 2024-12-26 ENCOUNTER — MYC REFILL (OUTPATIENT)
Dept: ORTHOPEDICS | Facility: CLINIC | Age: 39
End: 2024-12-26
Payer: COMMERCIAL

## 2024-12-26 ENCOUNTER — THERAPY VISIT (OUTPATIENT)
Dept: PHYSICAL THERAPY | Facility: CLINIC | Age: 39
End: 2024-12-26
Attending: NURSE PRACTITIONER
Payer: COMMERCIAL

## 2024-12-26 DIAGNOSIS — Z96.651 S/P RIGHT UNICOMPARTMENTAL KNEE REPLACEMENT: Primary | ICD-10-CM

## 2024-12-26 DIAGNOSIS — Z96.651 S/P RIGHT UNICOMPARTMENTAL KNEE REPLACEMENT: ICD-10-CM

## 2024-12-26 PROCEDURE — 97140 MANUAL THERAPY 1/> REGIONS: CPT | Mod: GP | Performed by: PHYSICAL THERAPIST

## 2024-12-26 PROCEDURE — 97110 THERAPEUTIC EXERCISES: CPT | Mod: GP | Performed by: PHYSICAL THERAPIST

## 2024-12-26 RX ORDER — HYDROXYZINE HYDROCHLORIDE 25 MG/1
25 TABLET, FILM COATED ORAL EVERY 6 HOURS PRN
Qty: 30 TABLET | Refills: 0 | Status: SHIPPED | OUTPATIENT
Start: 2024-12-26

## 2024-12-26 NOTE — TELEPHONE ENCOUNTER
Hydroxyzine HCI (ATARAX) 25 mg tablet   Last Written Prescription Date:  12/16/2024  Last Fill Quantity: 30,  # refills: 0   Last office visit: 12/18/2024 with prescribing provider:  NICHOLAS Marinelli CNP   Future Office Visit:  n/a    Requested Prescriptions   Pending Prescriptions Disp Refills    hydrOXYzine HCl (ATARAX) 25 MG tablet 30 tablet 0     Sig: Take 1 tablet (25 mg) by mouth every 6 hours as needed for itching or anxiety (with pain, moderate pain).       Antihistamines Protocol Failed - 12/26/2024  8:57 AM        Failed - Medication indicated for associated diagnosis     The medication is associated with one or more of the following diagnoses:  Allergies  Rhinitis  Upper respiratory tract allergy  Urticaria  Itching  Cystic Fibrosis  Bronchiectasis          Passed - Recent (12 mo) or future (30 days) visit within the authorizing provider's specialty     The patient must have completed an in-person or virtual visit within the past 12 months or has a future visit scheduled within the next 90 days with the authorizing provider s specialty.  Urgent care and e-visits do not qualify as an office visit for this protocol.          Passed - Patient is age 3 or older     Apply age and/or weight-based dosing for peds patients age 3 and older.    Forward request to provider for patients under the age of 3.          Passed - Medication is active on med list             Medication has been refilled.     Melissa Walton RN on 12/26/2024 at 8:57 AM

## 2025-01-20 ENCOUNTER — OFFICE VISIT (OUTPATIENT)
Dept: ORTHOPEDICS | Facility: CLINIC | Age: 40
End: 2025-01-20
Payer: COMMERCIAL

## 2025-01-20 VITALS
SYSTOLIC BLOOD PRESSURE: 131 MMHG | HEART RATE: 86 BPM | WEIGHT: 205 LBS | BODY MASS INDEX: 32.18 KG/M2 | TEMPERATURE: 97.6 F | DIASTOLIC BLOOD PRESSURE: 84 MMHG | HEIGHT: 67 IN

## 2025-01-20 DIAGNOSIS — Z96.651 S/P RIGHT UNICOMPARTMENTAL KNEE REPLACEMENT: Primary | ICD-10-CM

## 2025-01-20 PROCEDURE — 99024 POSTOP FOLLOW-UP VISIT: CPT | Performed by: ORTHOPAEDIC SURGERY

## 2025-01-20 ASSESSMENT — PAIN SCALES - GENERAL: PAINLEVEL_OUTOF10: MILD PAIN (3)

## 2025-01-20 NOTE — LETTER
1/20/2025      Kenn Dobson  [address]  [facility] [address]      Dear Colleague,    Thank you for referring your patient, eKnn Dobson, to the [facility]. Please see a copy of my visit note below.    Orthopedic Clinic Post-Operative Note    CHIEF COMPLAINT:   Chief Complaint   Patient presents with     Right Knee - Surgical Followup        Right knee Torrey assisted partial medial uni- compartment arthroplasty, DOS: 12/3/2024 (7w)           HISTORY OF PRESENT ILLNESS  Today's visit:  He is doing well, he notes overall improvement in the knee with some days being better than others. He notes he is more sore after physical therapy. When he went back to work he noticed swelling in the knee with prolonged sitting however this is improving. He is taking Tylenol for pain daily. Has not needed to take gabapentin for the last week.   Just recently he was volunteering at an event which required him to be walking up all day.  Towards end of the day it was sore.  However the next day it felt much better.      December 18, 2024 visit:  Returns. Improving. Attending therapy in [place]. Reports started having regular bowel movement again. Did stop narcotics due to constipation and other side effects. States the knee pain was more difficult to control after stopping narcotics but is managing with tylenol during the day and gabapentin at night. Sleep can be difficult due to pain but gabapentin is helping.  Recently increased to 300 mg with titration up to 600mg at nighttime by Dr. Maldonado.  No new injuries. No incision concerns. Feels his activity is increasing daily. Feels ready to return to work with home office tomorrow and back to work unrestricted January 2nd.  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         December 3, 2024 surgery: Right knee medial unicompartmental arthroplasty      Patient's past medical, surgical, social and family histories  reviewed.     Past Medical History:   Diagnosis Date     Anxiety      Bipolar affective (H)      Emotional disorder      Schizoaffective disorder (H)        Past Surgical History:   Procedure Laterality Date     ARTHROPLASTY, KNEE UNICOMPARTMENTAL SHAHID ROBOT Right 12/3/2024    Procedure: right knee shahid assisted partial medial knee replacement;  Surgeon: Zac Marie DO;  Location:  OR       Medications:  Current Outpatient Medications   Medication Sig Dispense Refill     acetaminophen (TYLENOL) 325 MG tablet Take 2 tablets (650 mg) by mouth every 4 hours as needed for other (mild pain). 100 tablet 0     fluticasone (FLONASE) 50 MCG/ACT nasal spray Spray 1 spray into both nostrils daily.       gabapentin (NEURONTIN) 300 MG capsule Take 1 capsule (300 mg) by mouth at bedtime for 2 days, THEN 2 capsules (600 mg) at bedtime. 62 capsule 0     senna-docusate (SENOKOT-S/PERICOLACE) 8.6-50 MG tablet Take 1-2 tablets by mouth 2 times daily. Take while on oral narcotics to prevent or treat constipation. 30 tablet 0     No current facility-administered medications for this visit.       Allergies   Allergen Reactions     Dogs        Social History     Occupational History     Not on file   Tobacco Use     Smoking status: Never     Smokeless tobacco: Never   Substance and Sexual Activity     Alcohol use: Not Currently     Comment: Drinks daily     Drug use: No     Sexual activity: Yes     Partners: Female       Family History   Problem Relation Age of Onset     Liver Cancer Maternal Grandfather        REVIEW OF SYSTEMS  General: negative for, night sweats, dizziness, fatigue  Resp: No shortness of breath and no cough  CV: negative for chest pain, syncope or near-syncope  GI: negative for nausea, vomiting and diarrhea  : negative for dysuria and hematuria  Musculoskeletal: as above  Neurologic: negative for syncope   Hematologic: negative for bleeding disorder    Physical Exam:  Vitals: /84   Pulse 86    "Temp 97.6  F (36.4  C)   Ht 1.7 m (5' 6.93\")   Wt 93 kg (205 lb)   BMI 32.18 kg/m    BMI= Body mass index is 32.18 kg/m .  Constitutional: healthy, alert and no acute distress   Psychiatric: mentation appears normal and affect normal/bright  NEURO: no focal deficits  SKIN: .well healed, no erythema, no incision breakdown and no drainage.  JOINT/EXTREMITIES: Right knee: 0-130 degrees motion.  No instability varus and valgus testing.  Patella tracks normally.  Good quad tone.  Minimal effusion.  GAIT: non-antalgic    Diagnostic Modalities:  Previous imaging reviewed.  Independent visualization of the images was performed.      Impression:   Chief Complaint   Patient presents with     Right Knee - Surgical Followup        Right knee Torrey assisted partial medial uni- compartment arthroplasty, DOS: 12/3/2024 (7w)       December 3, 2024 surgery: Right knee medial unicompartmental arthroplasty    Plan:   Progressing as expected.  We discussed terminal motion.  Continue working on gentle flexion.  Nothing forceful.  Will continue to notice improvement over the next 6 weeks.  Plan to see him back in 6 weeks if any issues.  Otherwise at the year anniversary  Return to clinic 6, week(s), PRN, or sooner as needed for changes.    Re-x-ray on return: Yes.  Three-view weightbearing right knee    Maicol Marie D.O.      Again, thank you for allowing me to participate in the care of your patient.        Sincerely,        Zac Marie, DO    Electronically signed"

## 2025-01-20 NOTE — PROGRESS NOTES
Orthopedic Clinic Post-Operative Note    CHIEF COMPLAINT:   Chief Complaint   Patient presents with    Right Knee - Surgical Followup        Right knee Torrey assisted partial medial uni- compartment arthroplasty, DOS: 12/3/2024 (7w)           HISTORY OF PRESENT ILLNESS  Today's visit:  He is doing well, he notes overall improvement in the knee with some days being better than others. He notes he is more sore after physical therapy. When he went back to work he noticed swelling in the knee with prolonged sitting however this is improving. He is taking Tylenol for pain daily. Has not needed to take gabapentin for the last week.   Just recently he was volunteering at an event which required him to be walking up all day.  Towards end of the day it was sore.  However the next day it felt much better.      December 18, 2024 visit:  Returns. Improving. Attending therapy in [place]. Reports started having regular bowel movement again. Did stop narcotics due to constipation and other side effects. States the knee pain was more difficult to control after stopping narcotics but is managing with tylenol during the day and gabapentin at night. Sleep can be difficult due to pain but gabapentin is helping.  Recently increased to 300 mg with titration up to 600mg at nighttime by Dr. Maldonado.  No new injuries. No incision concerns. Feels his activity is increasing daily. Feels ready to return to work with home office tomorrow and back to work unrestricted January 2nd.  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         December 3, 2024 surgery: Right knee medial unicompartmental arthroplasty      Patient's past medical, surgical, social and family histories reviewed.     Past Medical History:   Diagnosis Date    Anxiety     Bipolar affective (H)     Emotional disorder     Schizoaffective disorder (H)        Past Surgical History:   Procedure Laterality Date    ARTHROPLASTY, KNEE UNICOMPARTMENTAL  "SHAHID ROBOT Right 12/3/2024    Procedure: right knee shahid assisted partial medial knee replacement;  Surgeon: Zac Marie DO;  Location: [facility] OR       Medications:  Current Outpatient Medications   Medication Sig Dispense Refill    acetaminophen (TYLENOL) 325 MG tablet Take 2 tablets (650 mg) by mouth every 4 hours as needed for other (mild pain). 100 tablet 0    fluticasone (FLONASE) 50 MCG/ACT nasal spray Spray 1 spray into both nostrils daily.      gabapentin (NEURONTIN) 300 MG capsule Take 1 capsule (300 mg) by mouth at bedtime for 2 days, THEN 2 capsules (600 mg) at bedtime. 62 capsule 0    senna-docusate (SENOKOT-S/PERICOLACE) 8.6-50 MG tablet Take 1-2 tablets by mouth 2 times daily. Take while on oral narcotics to prevent or treat constipation. 30 tablet 0     No current facility-administered medications for this visit.       Allergies   Allergen Reactions    Dogs        Social History     Occupational History    Not on file   Tobacco Use    Smoking status: Never    Smokeless tobacco: Never   Substance and Sexual Activity    Alcohol use: Not Currently     Comment: Drinks daily    Drug use: No    Sexual activity: Yes     Partners: Female       Family History   Problem Relation Age of Onset    Liver Cancer Maternal Grandfather        REVIEW OF SYSTEMS  General: negative for, night sweats, dizziness, fatigue  Resp: No shortness of breath and no cough  CV: negative for chest pain, syncope or near-syncope  GI: negative for nausea, vomiting and diarrhea  : negative for dysuria and hematuria  Musculoskeletal: as above  Neurologic: negative for syncope   Hematologic: negative for bleeding disorder    Physical Exam:  Vitals: /84   Pulse 86   Temp 97.6  F (36.4  C)   Ht 1.7 m (5' 6.93\")   Wt 93 kg (205 lb)   BMI 32.18 kg/m    BMI= Body mass index is 32.18 kg/m .  Constitutional: healthy, alert and no acute distress   Psychiatric: mentation appears normal and affect normal/bright  NEURO: no focal " deficits  SKIN: .well healed, no erythema, no incision breakdown and no drainage.  JOINT/EXTREMITIES: Right knee: 0-130 degrees motion.  No instability varus and valgus testing.  Patella tracks normally.  Good quad tone.  Minimal effusion.  GAIT: non-antalgic    Diagnostic Modalities:  Previous imaging reviewed.  Independent visualization of the images was performed.      Impression:   Chief Complaint   Patient presents with    Right Knee - Surgical Followup        Right knee Torrey assisted partial medial uni- compartment arthroplasty, DOS: 12/3/2024 (7w)       December 3, 2024 surgery: Right knee medial unicompartmental arthroplasty    Plan:   Progressing as expected.  We discussed terminal motion.  Continue working on gentle flexion.  Nothing forceful.  Will continue to notice improvement over the next 6 weeks.  Plan to see him back in 6 weeks if any issues.  Otherwise at the year anniversary  Return to clinic 6, week(s), PRN, or sooner as needed for changes.    Re-x-ray on return: Yes.  Three-view weightbearing right knee    Maicol Marie D.O.

## (undated) DEVICE — GLOVE BIOGEL INDICATOR 7.5 LF 41675

## (undated) DEVICE — GLOVE BIOGEL PI ULTRATOUCH G SZ 7.5 42175

## (undated) DEVICE — Device

## (undated) DEVICE — SOL NACL 0.9% INJ 1000ML BAG 07983-09

## (undated) DEVICE — BONE CLEANING TIP INTERPULSE  0210-010-000

## (undated) DEVICE — SUCTION IRR SYSTEM W/O TIP INTERPULSE HANDPIECE 0210-100-000

## (undated) DEVICE — PEN MARKING SKIN

## (undated) DEVICE — SU MONOCRYL 4-0 PS-2 18" UND Y496G

## (undated) DEVICE — MARKER SURGICAL CHECKPOINT KIT STRL LF DISP 111645

## (undated) DEVICE — SUCTION TIP YANKAUER ORTHO SUPER SUCKER EFS-111

## (undated) DEVICE — DRSG AQUACEL AG 3.5X14"  422607

## (undated) DEVICE — BNDG ESMARK 6" STERILE

## (undated) DEVICE — CAST PADDING 6" UNSTERILE 9046

## (undated) DEVICE — DRSG AQUACEL AG ADV 3.5X4" 422603

## (undated) DEVICE — SU DERMABOND ADVANCED .7ML DNX12

## (undated) DEVICE — SU VICRYL+ 0 OS-6 18IN UND VCP754T

## (undated) DEVICE — BNDG COBAN 6"X5YDS STERILE

## (undated) DEVICE — NDL COUNTER 40CT  31142311

## (undated) DEVICE — HOOD FLYTE 0408-800-000

## (undated) DEVICE — DRAPE EXTREMITY W/ARMBOARD 29405

## (undated) DEVICE — DRAPE CONVERTORS U-DRAPE 60X72" 8476

## (undated) DEVICE — SYR EAR BULB 2OZ

## (undated) DEVICE — MARKER SYS NAVIGATION VIZADISC KNEE TRACK KIT STRL LF 107120

## (undated) DEVICE — DRAPE POUCH INSTRUMENT 1018

## (undated) DEVICE — BLADE SAW SAGITTAL MAKO STANDARD STERILE DISP 116170

## (undated) DEVICE — CAST PADDING 6" WEBRIL STERILE

## (undated) DEVICE — POSITIONER OR LEG KIT DISPOSABLE 111618

## (undated) DEVICE — SU VICRYL+ 2-0 CP-2 18IN UND VCP762D

## (undated) DEVICE — PREP CHLORAPREP 26ML TINTED ORANGE  260815

## (undated) DEVICE — GLOVE BIOGEL PI INDICATOR 8.0 LF 41680

## (undated) DEVICE — PACK TOTAL JOINT STD LATEX

## (undated) DEVICE — SOL WATER IRRIG 1000ML BOTTLE 07139-09

## (undated) DEVICE — CORD RETRACTION SILICON 111619

## (undated) DEVICE — ESU PENCIL SMOKE EVAC W/ROCKER SWITCH 0703-047-000

## (undated) DEVICE — KIT DRAPE RIO 1 PIECE POCKET RIO 111320

## (undated) DEVICE — TOURNIQUET CUFF 34"

## (undated) RX ORDER — PROPOFOL 10 MG/ML
INJECTION, EMULSION INTRAVENOUS
Status: DISPENSED
Start: 2024-12-03

## (undated) RX ORDER — BUPIVACAINE HYDROCHLORIDE AND EPINEPHRINE 5; 5 MG/ML; UG/ML
INJECTION, SOLUTION EPIDURAL; INTRACAUDAL; PERINEURAL
Status: DISPENSED
Start: 2024-12-03

## (undated) RX ORDER — DEXAMETHASONE SODIUM PHOSPHATE 10 MG/ML
INJECTION, SOLUTION INTRAMUSCULAR; INTRAVENOUS
Status: DISPENSED
Start: 2024-12-03

## (undated) RX ORDER — FENTANYL CITRATE 50 UG/ML
INJECTION, SOLUTION INTRAMUSCULAR; INTRAVENOUS
Status: DISPENSED
Start: 2024-12-03